# Patient Record
Sex: MALE | Race: WHITE | Employment: PART TIME | URBAN - NONMETROPOLITAN AREA
[De-identification: names, ages, dates, MRNs, and addresses within clinical notes are randomized per-mention and may not be internally consistent; named-entity substitution may affect disease eponyms.]

---

## 2017-08-29 ENCOUNTER — APPOINTMENT (OUTPATIENT)
Dept: GENERAL RADIOLOGY | Age: 52
End: 2017-08-29
Payer: COMMERCIAL

## 2017-08-29 ENCOUNTER — HOSPITAL ENCOUNTER (EMERGENCY)
Age: 52
Discharge: HOME | End: 2017-08-29
Attending: EMERGENCY MEDICINE
Payer: COMMERCIAL

## 2017-08-29 VITALS
TEMPERATURE: 98.5 F | BODY MASS INDEX: 30.48 KG/M2 | HEART RATE: 70 BPM | WEIGHT: 230 LBS | HEIGHT: 73 IN | RESPIRATION RATE: 18 BRPM | DIASTOLIC BLOOD PRESSURE: 86 MMHG | OXYGEN SATURATION: 96 % | SYSTOLIC BLOOD PRESSURE: 148 MMHG

## 2017-08-29 DIAGNOSIS — Z72.820 SLEEP DEFICIENT: ICD-10-CM

## 2017-08-29 DIAGNOSIS — R53.83 LOW ENERGY: Primary | ICD-10-CM

## 2017-08-29 LAB
ALBUMIN SERPL-MCNC: 4.1 G/DL (ref 3.5–5.1)
ALP BLD-CCNC: 79 U/L (ref 38–126)
ALT SERPL-CCNC: 47 U/L (ref 11–66)
AMORPHOUS: NORMAL
AMPHETAMINE+METHAMPHETAMINE URINE SCREEN: NEGATIVE
ANION GAP SERPL CALCULATED.3IONS-SCNC: 14 MEQ/L (ref 8–16)
AST SERPL-CCNC: 29 U/L (ref 5–40)
BACTERIA: NORMAL
BARBITURATE QUANTITATIVE URINE: NEGATIVE
BASOPHILS # BLD: 0.6 %
BASOPHILS ABSOLUTE: 0 THOU/MM3 (ref 0–0.1)
BENZODIAZEPINE QUANTITATIVE URINE: NEGATIVE
BILIRUB SERPL-MCNC: 0.2 MG/DL (ref 0.3–1.2)
BILIRUBIN URINE: NEGATIVE
BLOOD, URINE: NEGATIVE
BUN BLDV-MCNC: 15 MG/DL (ref 7–22)
CALCIUM SERPL-MCNC: 9.4 MG/DL (ref 8.5–10.5)
CANNABINOID QUANTITATIVE URINE: NEGATIVE
CASTS: NORMAL /LPF
CHARACTER, URINE: CLEAR
CHLORIDE BLD-SCNC: 100 MEQ/L (ref 98–111)
CO2: 27 MEQ/L (ref 23–33)
COCAINE METABOLITE QUANTITATIVE URINE: NEGATIVE
COLOR: YELLOW
CREAT SERPL-MCNC: 0.7 MG/DL (ref 0.4–1.2)
CRYSTALS: NORMAL
EKG ATRIAL RATE: 60 BPM
EKG P AXIS: 47 DEGREES
EKG P-R INTERVAL: 160 MS
EKG Q-T INTERVAL: 384 MS
EKG QRS DURATION: 88 MS
EKG QTC CALCULATION (BAZETT): 384 MS
EKG R AXIS: 44 DEGREES
EKG T AXIS: 53 DEGREES
EKG VENTRICULAR RATE: 60 BPM
EOSINOPHIL # BLD: 4.3 %
EOSINOPHILS ABSOLUTE: 0.3 THOU/MM3 (ref 0–0.4)
EPITHELIAL CELLS, UA: NORMAL /HPF
GFR SERPL CREATININE-BSD FRML MDRD: > 90 ML/MIN/1.73M2
GLUCOSE BLD-MCNC: 94 MG/DL (ref 70–108)
GLUCOSE, URINE: NEGATIVE MG/DL
HCT VFR BLD CALC: 42.7 % (ref 42–52)
HEMOGLOBIN: 14.6 GM/DL (ref 14–18)
KETONES, URINE: NEGATIVE
LEUKOCYTE ESTERASE, URINE: NEGATIVE
LYMPHOCYTES # BLD: 31 %
LYMPHOCYTES ABSOLUTE: 2 THOU/MM3 (ref 1–4.8)
MCH RBC QN AUTO: 31.3 PG (ref 27–31)
MCHC RBC AUTO-ENTMCNC: 34.2 GM/DL (ref 33–37)
MCV RBC AUTO: 91.4 FL (ref 80–94)
MONOCYTES # BLD: 7.8 %
MONOCYTES ABSOLUTE: 0.5 THOU/MM3 (ref 0.4–1.3)
NITRITE, URINE: NEGATIVE
NUCLEATED RED BLOOD CELLS: 0 /100 WBC
OPIATES, URINE: NEGATIVE
OSMOLALITY CALCULATION: 281.8 MOSMOL/KG (ref 275–300)
OXYCODONE: NEGATIVE
PDW BLD-RTO: 13 % (ref 11.5–14.5)
PH UA: 6.5
PHENCYCLIDINE QUANTITATIVE URINE: NEGATIVE
PLATELET # BLD: 211 THOU/MM3 (ref 130–400)
PMV BLD AUTO: 7.7 MCM (ref 7.4–10.4)
POTASSIUM SERPL-SCNC: 3.8 MEQ/L (ref 3.5–5.2)
PROTEIN UA: NEGATIVE MG/DL
RBC # BLD: 4.67 MILL/MM3 (ref 4.7–6.1)
RBC # BLD: NORMAL 10*6/UL
RBC URINE: NORMAL /HPF
RENAL EPITHELIAL, UA: NORMAL
SEG NEUTROPHILS: 56.3 %
SEGMENTED NEUTROPHILS ABSOLUTE COUNT: 3.5 THOU/MM3 (ref 1.8–7.7)
SODIUM BLD-SCNC: 141 MEQ/L (ref 135–145)
SPECIFIC GRAVITY UA: 1.01 (ref 1–1.03)
TOTAL PROTEIN: 6.8 G/DL (ref 6.1–8)
TROPONIN T: < 0.01 NG/ML
TSH SERPL DL<=0.05 MIU/L-ACNC: 2.4 UIU/ML (ref 0.4–4.2)
UROBILINOGEN, URINE: 0.2 EU/DL
WBC # BLD: 6.3 THOU/MM3 (ref 4.8–10.8)
WBC UA: NORMAL /HPF
YEAST: NORMAL

## 2017-08-29 PROCEDURE — 36415 COLL VENOUS BLD VENIPUNCTURE: CPT

## 2017-08-29 PROCEDURE — 80307 DRUG TEST PRSMV CHEM ANLYZR: CPT

## 2017-08-29 PROCEDURE — 80050 GENERAL HEALTH PANEL: CPT

## 2017-08-29 PROCEDURE — 96361 HYDRATE IV INFUSION ADD-ON: CPT

## 2017-08-29 PROCEDURE — 2580000003 HC RX 258: Performed by: EMERGENCY MEDICINE

## 2017-08-29 PROCEDURE — 71020 XR CHEST STANDARD TWO VW: CPT

## 2017-08-29 PROCEDURE — 96360 HYDRATION IV INFUSION INIT: CPT

## 2017-08-29 PROCEDURE — 93005 ELECTROCARDIOGRAM TRACING: CPT

## 2017-08-29 PROCEDURE — 81001 URINALYSIS AUTO W/SCOPE: CPT

## 2017-08-29 PROCEDURE — 84484 ASSAY OF TROPONIN QUANT: CPT

## 2017-08-29 PROCEDURE — 99285 EMERGENCY DEPT VISIT HI MDM: CPT

## 2017-08-29 RX ORDER — LISINOPRIL AND HYDROCHLOROTHIAZIDE 12.5; 1 MG/1; MG/1
1 TABLET ORAL DAILY
COMMUNITY

## 2017-08-29 RX ORDER — VENLAFAXINE HYDROCHLORIDE 150 MG/1
150 CAPSULE, EXTENDED RELEASE ORAL DAILY
COMMUNITY
End: 2022-06-08 | Stop reason: SDUPTHER

## 2017-08-29 RX ORDER — 0.9 % SODIUM CHLORIDE 0.9 %
500 INTRAVENOUS SOLUTION INTRAVENOUS ONCE
Status: COMPLETED | OUTPATIENT
Start: 2017-08-29 | End: 2017-08-29

## 2017-08-29 RX ORDER — OMEPRAZOLE 40 MG/1
40 CAPSULE, DELAYED RELEASE ORAL PRN
COMMUNITY

## 2017-08-29 RX ORDER — MELOXICAM 15 MG/1
15 TABLET ORAL DAILY
COMMUNITY

## 2017-08-29 RX ORDER — BUPROPION HYDROCHLORIDE 150 MG/1
150 TABLET, EXTENDED RELEASE ORAL DAILY
COMMUNITY
End: 2022-06-08

## 2017-08-29 RX ADMIN — SODIUM CHLORIDE 500 ML: 9 INJECTION, SOLUTION INTRAVENOUS at 14:18

## 2022-06-07 NOTE — PROGRESS NOTES
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 instructed to seek emergency help via the emergency and/or calling 911 should symptoms become severe, worsen, or with other concerning symptoms. Patient instructed to go immediately to the emergency room and/or call 911 with any suicidal or homicidal ideations or if audio/visual hallucinations develop. Patient stated understanding and agrees. Patient given crisis center information. I spent a total of 120 minutes with the patient and over half of that time was spent on counseling and coordination of care regarding topics discussed above. I spent 100 minutes with the patient for psychotherapy. The patient was engaged and responsive throughout session. Utilized CBT, MI and reflective listening to address topics above. The remainder of session spent on symptom evaluation and medication management. Noé Lundberg, was evaluated through a synchronous (real-time) audio-video encounter. The patient (or guardian if applicable) is aware that this is a billable service, which includes applicable co-pays. This Virtual Visit was conducted with patient's (and/or legal guardian's) consent.  The visit was conducted pursuant to the emergency declaration under the [facility], [facility] authority and the [employer] and [employer] General Act. Patient identification was verified, and a caregiver was present when appropriate. The patient was located at Home: [address] [place] [address]. Provider was located at Home ([facility]): [place]. Total time spent for this encounter: [address], APRN - CNP on 6/8/2022 at 1:53 PM    An electronic signature was used to authenticate this note.

## 2022-06-08 ENCOUNTER — TELEPHONE (OUTPATIENT)
Dept: PSYCHIATRY | Age: 57
End: 2022-06-08

## 2022-06-08 ENCOUNTER — SCHEDULED TELEPHONE ENCOUNTER (OUTPATIENT)
Dept: PSYCHIATRY | Age: 57
End: 2022-06-08
Payer: COMMERCIAL

## 2022-06-08 DIAGNOSIS — F33.1 MAJOR DEPRESSIVE DISORDER, RECURRENT EPISODE, MODERATE (HCC): Primary | ICD-10-CM

## 2022-06-08 DIAGNOSIS — F43.12 CHRONIC POST-TRAUMATIC STRESS DISORDER (PTSD): ICD-10-CM

## 2022-06-08 DIAGNOSIS — F41.1 GAD (GENERALIZED ANXIETY DISORDER): ICD-10-CM

## 2022-06-08 PROCEDURE — 90792 PSYCH DIAG EVAL W/MED SRVCS: CPT

## 2022-06-08 RX ORDER — ARIPIPRAZOLE 5 MG/1
2.5 TABLET ORAL DAILY
Qty: 30 TABLET | Refills: 0 | Status: SHIPPED | OUTPATIENT
Start: 2022-06-08 | End: 2022-06-22 | Stop reason: SDUPTHER

## 2022-06-08 RX ORDER — VENLAFAXINE HYDROCHLORIDE 75 MG/1
75 CAPSULE, EXTENDED RELEASE ORAL DAILY
Qty: 30 CAPSULE | Refills: 0 | Status: SHIPPED | OUTPATIENT
Start: 2022-06-08 | End: 2022-06-22 | Stop reason: SDUPTHER

## 2022-06-08 RX ORDER — BUPROPION HYDROCHLORIDE 100 MG/1
100 TABLET, EXTENDED RELEASE ORAL DAILY
Qty: 30 TABLET | Refills: 0 | Status: SHIPPED | OUTPATIENT
Start: 2022-06-08 | End: 2022-06-22 | Stop reason: ALTCHOICE

## 2022-06-08 RX ORDER — BUSPIRONE HYDROCHLORIDE 15 MG/1
15 TABLET ORAL 2 TIMES DAILY
Qty: 60 TABLET | Refills: 0 | Status: SHIPPED | OUTPATIENT
Start: 2022-06-08 | End: 2022-07-11 | Stop reason: SDUPTHER

## 2022-06-08 RX ORDER — VENLAFAXINE HYDROCHLORIDE 150 MG/1
150 CAPSULE, EXTENDED RELEASE ORAL DAILY
Qty: 30 CAPSULE | Refills: 0 | Status: SHIPPED | OUTPATIENT
Start: 2022-06-08 | End: 2022-06-22 | Stop reason: SDUPTHER

## 2022-06-08 ASSESSMENT — PATIENT HEALTH QUESTIONNAIRE - PHQ9
7. TROUBLE CONCENTRATING ON THINGS, SUCH AS READING THE NEWSPAPER OR WATCHING TELEVISION: 3
10. IF YOU CHECKED OFF ANY PROBLEMS, HOW DIFFICULT HAVE THESE PROBLEMS MADE IT FOR YOU TO DO YOUR WORK, TAKE CARE OF THINGS AT HOME, OR GET ALONG WITH OTHER PEOPLE: 2
SUM OF ALL RESPONSES TO PHQ QUESTIONS 1-9: 22
5. POOR APPETITE OR OVEREATING: 0
9. THOUGHTS THAT YOU WOULD BE BETTER OFF DEAD, OR OF HURTING YOURSELF: 3
2. FEELING DOWN, DEPRESSED OR HOPELESS: 2
SUM OF ALL RESPONSES TO PHQ QUESTIONS 1-9: 22
SUM OF ALL RESPONSES TO PHQ9 QUESTIONS 1 & 2: 5
1. LITTLE INTEREST OR PLEASURE IN DOING THINGS: 3
SUM OF ALL RESPONSES TO PHQ QUESTIONS 1-9: 22
6. FEELING BAD ABOUT YOURSELF - OR THAT YOU ARE A FAILURE OR HAVE LET YOURSELF OR YOUR FAMILY DOWN: 3
SUM OF ALL RESPONSES TO PHQ QUESTIONS 1-9: 19
8. MOVING OR SPEAKING SO SLOWLY THAT OTHER PEOPLE COULD HAVE NOTICED. OR THE OPPOSITE, BEING SO FIGETY OR RESTLESS THAT YOU HAVE BEEN MOVING AROUND A LOT MORE THAN USUAL: 3
3. TROUBLE FALLING OR STAYING ASLEEP: 2
4. FEELING TIRED OR HAVING LITTLE ENERGY: 3

## 2022-06-08 ASSESSMENT — ANXIETY QUESTIONNAIRES
6. BECOMING EASILY ANNOYED OR IRRITABLE: 1
IF YOU CHECKED OFF ANY PROBLEMS ON THIS QUESTIONNAIRE, HOW DIFFICULT HAVE THESE PROBLEMS MADE IT FOR YOU TO DO YOUR WORK, TAKE CARE OF THINGS AT HOME, OR GET ALONG WITH OTHER PEOPLE: EXTREMELY DIFFICULT
7. FEELING AFRAID AS IF SOMETHING AWFUL MIGHT HAPPEN: 3
3. WORRYING TOO MUCH ABOUT DIFFERENT THINGS: 3
5. BEING SO RESTLESS THAT IT IS HARD TO SIT STILL: 2
2. NOT BEING ABLE TO STOP OR CONTROL WORRYING: 3
1. FEELING NERVOUS, ANXIOUS, OR ON EDGE: 1
4. TROUBLE RELAXING: 1
GAD7 TOTAL SCORE: 14

## 2022-06-08 ASSESSMENT — COLUMBIA-SUICIDE SEVERITY RATING SCALE - C-SSRS
1. WITHIN THE PAST MONTH, HAVE YOU WISHED YOU WERE DEAD OR WISHED YOU COULD GO TO SLEEP AND NOT WAKE UP?: YES
6. HAVE YOU EVER DONE ANYTHING, STARTED TO DO ANYTHING, OR PREPARED TO DO ANYTHING TO END YOUR LIFE?: NO
2. HAVE YOU ACTUALLY HAD ANY THOUGHTS OF KILLING YOURSELF?: YES
3. HAVE YOU BEEN THINKING ABOUT HOW YOU MIGHT KILL YOURSELF?: YES
4. HAVE YOU HAD THESE THOUGHTS AND HAD SOME INTENTION OF ACTING ON THEM?: YES
5. HAVE YOU STARTED TO WORK OUT OR WORKED OUT THE DETAILS OF HOW TO KILL YOURSELF? DO YOU INTEND TO CARRY OUT THIS PLAN?: NO

## 2022-06-08 NOTE — PATIENT INSTRUCTIONS
Patient Education        Anxiety Disorder: Care Instructions  Your Care Instructions     Anxiety is a normal reaction to stress. Difficult situations can cause you to have symptoms such as sweaty palms and a nervous feeling. In an anxiety disorder, the symptoms are far more severe. Constant worry, muscle tension, trouble sleeping, nausea and diarrhea, and other symptoms can make normal daily activities difficult or impossible. These symptoms may occur for no reason, and they can affect your work, school, or social life. Medicines, counseling, and self-care can all help. Follow-up care is a key part of your treatment and safety. Be sure to make and go to all appointments, and call your doctor if you are having problems. It's also a good idea to know your test results and keep a list of the medicines you take. How can you care for yourself at home? · Take medicines exactly as directed. Call your doctor if you think you are having a problem with your medicine. · Go to your counseling sessions and follow-up appointments. · Recognize and accept your anxiety. Then, when you are in a situation that makes you anxious, say to yourself, \"This is not an emergency. I feel uncomfortable, but I am not in danger. I can keep going even if I feel anxious. \"  · Be kind to your body:  ? Relieve tension with exercise or a massage. ? Get enough rest.  ? Avoid alcohol, caffeine, nicotine, and illegal drugs. They can increase your anxiety level and cause sleep problems. ? Learn and do relaxation techniques. See below for more about these techniques. · Engage your mind. Get out and do something you enjoy. Go to a funny movie, or take a walk or hike. Plan your day. Having too much or too little to do can make you anxious. · Keep a record of your symptoms. Discuss your fears with a good friend or family member, or join a support group for people with similar problems. Talking to others sometimes relieves stress.   · Get involved in social groups, or volunteer to help others. Being alone sometimes makes things seem worse than they are. · Get at least 30 minutes of exercise on most days of the week to relieve stress. Walking is a good choice. You also may want to do other activities, such as running, swimming, cycling, or playing tennis or team sports. Relaxation techniques  Do relaxation exercises 10 to 20 minutes a day. You can play soothing, relaxing music while you do them, if you wish. · Tell others in your house that you are going to do your relaxation exercises. Ask them not to disturb you. · Find a comfortable place, away from all distractions and noise. · Lie down on your back, or sit with your back straight. · Focus on your breathing. Make it slow and steady. · Breathe in through your nose. Breathe out through either your nose or mouth. · Breathe deeply, filling up the area between your navel and your rib cage. Breathe so that your belly goes up and down. · Do not hold your breath. · Breathe like this for 5 to 10 minutes. Notice the feeling of calmness throughout your whole body. As you continue to breathe slowly and deeply, relax by doing the following for another 5 to 10 minutes:  · Tighten and relax each muscle group in your body. You can begin at your toes and work your way up to your head. · Imagine your muscle groups relaxing and becoming heavy. · Empty your mind of all thoughts. · Let yourself relax more and more deeply. · Become aware of the state of calmness that surrounds you. · When your relaxation time is over, you can bring yourself back to alertness by moving your fingers and toes and then your hands and feet and then stretching and moving your entire body. Sometimes people fall asleep during relaxation, but they usually wake up shortly afterward. · Always give yourself time to return to full alertness before you drive a car or do anything that might cause an accident if you are not fully alert.  Never play a relaxation tape while you drive a car. When should you call for help? Call 911 anytime you think you may need emergency care. For example, call if:    · You feel you cannot stop from hurting yourself or someone else. Keep the numbers for these national suicide hotlines: 1-477-670-TALK (3-585-387-615.361.5780) and 0-008-LZAOQPO (2-412.126.6883). If you or someone you know talks about suicide or feeling hopeless, get help right away. Watch closely for changes in your health, and be sure to contact your doctor if:    · You have anxiety or fear that affects your life.     · You have symptoms of anxiety that are new or different from those you had before. Where can you learn more? Go to https://Voxli.Smart Energy Instruments. org and sign in to your SENSIMED account. Enter P754 in the Globe Icons Interactive box to learn more about \"Anxiety Disorder: Care Instructions. \"     If you do not have an account, please click on the \"Sign Up Now\" link. Current as of: September 23, 2020               Content Version: 12.9  © 2006-2021 [employer]. Care instructions adapted under license by [facility] ([facility]). If you have questions about a medical condition or this instruction, always ask your healthcare professional. [address] any warranty or liability for your use of this information. Patient Education        Recovering From Depression: Care Instructions  Your Care Instructions     Taking good care of yourself is important as you recover from depression. In time, your symptoms will fade as your treatment takes hold. Do not give up. Instead, focus your energy on getting better. Your mood will improve. It just takes some time. Focus on things that can help you feel better, such as being with friends and family, eating well, and getting enough rest. But take things slowly. Do not do too much too soon. Youwill begin to feel better gradually.   Follow-up care is a key part of your treatment and safety. Be sure to make and go to all appointments, and call your doctor if you are having problems. It's also a good idea to know your test results and keep alist of the medicines you take. How can you care for yourself at home? Be realistic   If you have a large task to do, break it up into smaller steps you can handle, and just do what you can.  You may want to put off important decisions until your depression has lifted. If you have plans that will have a major impact on your life, such as marriage, divorce, or a job change, try to wait a bit. Talk it over with friends and loved ones who can help you look at the overall picture first.   Reaching out to people for help is important. Do not isolate yourself. Let your family and friends help you. Find someone you can trust and confide in, and talk to that person.  Be patient, and be kind to yourself. Remember that depression is not your fault and is not something you can overcome with willpower alone. Treatment is important for depression, just like for any other illness. Feeling better takes time, and your mood will improve little by little. Stay active   Stay busy and get outside. Take a walk, or try some other light exercise.  Talk with your doctor about an exercise program. Exercise can help with mild depression.  Go to a movie or concert. Take part in a [employer] activity or other social gathering. Go to a ball game.  Ask a friend to have dinner with you. Take care of yourself   Eat a balanced diet with plenty of fresh fruits and vegetables, whole grains, and lean protein. If you have lost your appetite, eat small snacks rather than large meals.  Avoid using illegal drugs or marijuana and drinking alcohol. Do not take medicines that have not been prescribed for you. They may interfere with medicines you may be taking for depression, or they may make your depression worse.  Take your medicines exactly as they are prescribed.  You may start to feel better within 1 to 3 weeks of taking antidepressant medicine. But it can take as many as 6 to 8 weeks to see more improvement. If you have questions or concerns about your medicines, or if you do not notice any improvement by 3 weeks, talk to your doctor.  Continue to take your medicine after your symptoms improve. Taking your medicine for at least 6 months after you feel better can help keep you from getting depressed again. If this isn't the first time you have been depressed, your doctor may recommend you to take medicine even longer.  If you have any side effects from your medicine, tell your doctor. Many side effects are mild and will go away on their own after you have been taking the medicine for a few weeks. Some may last longer. Talk to your doctor if side effects are bothering you too much. You might be able to try a different medicine.  Continue counseling. It may help prevent depression from returning, especially if you've had multiple episodes of depression. Talk with your counselor if you are having a hard time attending your sessions or you think the sessions aren't working. Don't just stop going.  Get enough sleep. Talk to your doctor if you are having problems sleeping.  Avoid sleeping pills unless they are prescribed by the doctor treating your depression. Sleeping pills may make you groggy during the day, and they may interact with other medicine you are taking.  If you have any other illnesses, such as diabetes, heart disease, or high blood pressure, make sure to continue with your treatment. Tell your doctor about all of the medicines you take, including those with or without a prescription.  If you or someone you know talks about suicide, self-harm, or feeling hopeless, get help right away. Call the [facility] at 1-800-273-talk (2-101.209.1564) or text HOME to 786799 to access the Crisis Text Line.  Consider saving these numbers in your phone.  When should you call for help? Call 911 anytime you think you may need emergency care. For example, call if:     You feel like hurting yourself or someone else.      Someone you know has depression and is about to attempt or is attempting suicide. Call your doctor now or seek immediate medical care if:     You hear voices.      Someone you know has depression and:  ? Starts to give away his or her possessions. ? Uses illegal drugs or drinks alcohol heavily. ? Talks or writes about death, including writing suicide notes or talking about guns, knives, or pills. ? Starts to spend a lot of time alone. ? Acts very aggressively or suddenly appears calm. Watch closely for changes in your health, and be sure to contact your doctor if:     You do not get better as expected. Where can you learn more? Go to https://TIKI.VNpedeboraheb.StandDesk. org and sign in to your FreshPlanet account. Enter N360 in the Anne Fogarty box to learn more about \"Recovering From Depression: Care Instructions. \"     If you do not have an account, please click on the \"Sign Up Now\" link. Current as of: June 16, 2021               Content Version: 13.2  © 3101-7014 Healthwise, [employer]well[employer]. Care instructions adapted under license by [facility] ([facility]). If you have questions about a medical condition or this instruction, always ask your healthcare professional. [place] [address] any warranty or liability for your use of this information. Patient Education        Suicidal Thoughts and Behavior: Care Instructions  Overview  You have been seen by a doctor because you've had thoughts of suicide or have harmed yourself. Your doctor and support team want to help keep you safe. Yourteam may include a , a , and a counselor. People often think about suicide because they feel hopeless, helpless, or worthless.  These feelings may come from having a mental health problem, such asdepression. These problems can be treated. It's important to remember that there are people who care about you. Your doctor and support team take your pain very seriously, and they want to help. Treatment and close follow-up care can help you feel better. Follow-up care is a key part of your treatment and safety. Be sure to make and go to all appointments, and call your doctor if you are having problems. It's also a good idea to know your test results and keep alist of the medicines you take. How can you care for yourself at home?  Talk to someone. Be open about your feelings. Reach out to a trusted family member or friend, your doctor, or a counselor. You can also call the [address] [place] [address] at 1-800-273-talk (7-926.471.3900). Or text HOME to 228197 to access the [place] Text Line. Consider saving these numbers in your phone.  Attend all counseling sessions recommended by your doctor.  Make a suicide safety plan. This is a set of steps you can take when you feel suicidal. It includes your warning signs, coping strategies, and people you can ask for support. It's best to work with a therapist to make your plan.  Ask someone to remove and store any guns, pills, or other means of suicide.  Avoid alcohol and drug use.  Be safe with medicines. Take your medicines exactly as prescribed. Call your doctor if you think you are having a problem with your medicine. When should you call for help? Call 911 anytime you think you may need emergency care. For example, call if:     You feel you cannot stop from hurting yourself or someone else. Call your doctor now or seek immediate medical care if:     You have one or more warning signs of suicide. For example, call if:  ? You feel like giving away your possessions. ? You use illegal drugs or drink alcohol heavily. ? You talk or write about death. This may include writing suicide notes and talking about guns, knives, or pills.   ? You start to spend a lot of time alone or spend more time alone than usual.      You hear voices.      You start acting in an aggressive way that's not normal for you. Watch closely for changes in your health, and be sure to contact your doctor ifyou have any problems. Where can you learn more? Go to https://chpepiceweb.uVore. org and sign in to your Picapica account. Enter B415 in the Osteogenix box to learn more about \"Suicidal Thoughts and Behavior: Care Instructions. \"     If you do not have an account, please click on the \"Sign Up Now\" link. Current as of: June 16, 2021               Content Version: 13.2  © 2006-2022 [employer]. Care instructions adapted under license by [facility] ([facility]). If you have questions about a medical condition or this instruction, always ask your healthcare professional. [place] [address] any warranty or liability for your use of this information. Patient Education        Learning About Making a Suicide Safety Plan  Overview     A safety plan is a set of steps you can take when you feel suicidal. It includes your warning signs, coping strategies, and people to ask for support. You can write your own safety plan or use a free phone varsha. But it's best towork with a therapist to make your plan. How can you make and use your plan? If you feel like you can't keep from hurting yourself or someone else, get help right away. Call 911 or the National Suicide Prevention Lifeline: 9-482-955-TALK (6-866.609.7832). Or text HOME to 632357 to access the Crisis Text Line. Having a safety plan is important for anyone who thinks about suicide. It can help you (or someone you care about) get safely through times of crisis. If possible, try to make your plan during a time when you aren't in a crisis soit's ready when you need it. To use the plan, move through it step-by-step. So first, check your warning signs.  Then try your own coping strategies. If those don't help, move through the rest of the steps until you have the help you need to get through thecrisis. Here's how to make a safety plan. 1. Make a list of your crisis warning signs. What happens when you start to think about suicide? Make a list of your warningsigns--the things you think, feel, or do when you start to feel suicidal.  2. List your personal coping strategies. What can you do or think about to avoid acting when you feel suicidal? This may include your reason(s) to live. Rank these ideas by how well you think they'll work. What might keep you from using them? What might make you more likely touse them? 3. Come up with some sources of support and distraction. Think of people and places that could help shift your attention away from painful feelings or thoughts of dying. This may include children you care aboutor a safe social space, like a coffee shop or a bookstore. 4. Make a list of people you can count on for help. Think about who you could contact in a crisis. Who do you trust and confide in? Who is always there when you need them? This might be a friend, a family member, or someone else, like a caregiver or . If no one comes to mind, that's okay. Be sure you have some professional support, such as a doctor orcounselor. 5. List your professional sources of support. This may include your doctor or therapist, local emergency rooms, and local crisis hotlines. Also include the National Suicide Prevention Lifeline: Call 6-587.425.2726 or text HOME to 061391. Be sure to save these numbers in yourphone. 6. Think through ways to keep yourself safe. Do you have weapons or other means to hurt yourself? Consider how to limit your access to them. For example, if you have a gun, maybe you could ask a friend orfamily member to lock it up for you. When should you call for help? Call 911 anytime you think you may need emergency care.  For example, call if:     You feel you cannot stop from hurting yourself or someone else. Call your doctor now or seek immediate medical care if:     You have one or more warning signs of suicide. For example, call if:  ? You feel like giving away your possessions. ? You use illegal drugs or drink alcohol heavily. ? You talk or write about death. This may include writing suicide notes and talking about guns, knives, or pills. ? You start to spend a lot of time alone or spend more time alone than usual.      You hear voices.      You start acting in an aggressive way that's not normal for you. Watch closely for changes in your health, and be sure to contact your doctor ifyou have any problems. Where can you learn more? Go to https://Peeriope365 Good Teachereb.Conversion Logic. org and sign in to your Black Raven and Stag account. Enter l123 in the Grove Labs box to learn more about \"Learning About Making a Suicide Safety Plan. \"     If you do not have an account, please click on the \"Sign Up Now\" link. Current as of: June 17, 2021               Content Version: 13.2  © 4001-5307 Healthwise, GeneAss[employer]. Care instructions adapted under license by [facility] ([facility]). If you have questions about a medical condition or this instruction, always ask your healthcare professional. [place] [address] any warranty or liability for your use of this information. Patient Education        Post-Traumatic Stress Disorder (PTSD): Care Instructions  Overview     Post-traumatic stress disorder (PTSD) is a mental health problem that can result from being in or seeing a traumatic or terrifying event. These events can include combat, a terrorist attack, a natural disaster, a serious accident, an assault, or a rape. If you have PTSD, you may often relive the experience in nightmares or flashbacks. These are clear and frightening memories of theevent. You may also have trouble sleeping. PTSD affects people in very different ways.  It can interfere with daily activities such as work or school, and it can make you withdraw from friends orloved ones. Follow-up care is a key part of your treatment and safety. Be sure to make and go to all appointments, and call your doctor if you are having problems. It's also a good idea to know your test results and keep alist of the medicines you take. How can you care for yourself at home?  Take your medicines exactly as they are prescribed. If you are taking an antidepressant, you may start to feel better within 1 to 3 weeks. But it can take as many as 6 to 8 weeks to see more improvement. If you have questions or concerns about your medicines, or if you don't notice any improvement after 3 weeks, talk to your doctor.  Go to your counseling sessions and follow-up appointments.  Recognize and accept your anxiety. Then, when you are in a situation that makes you anxious, say to yourself, \"This is not an emergency. I feel uncomfortable, but I am not in danger. I can keep going even if I feel anxious. \"   Be kind to your body:  ? Get enough rest.  ? Get at least 30 minutes of exercise on most days of the week. Walking is a good choice. You also may want to do other activities, such as running, swimming, cycling, or playing tennis or team sports. ? Avoid alcohol, caffeine, nicotine, marijuana, and illegal drugs. They can make your symptoms worse. ? Learn and do relaxation techniques. See below for more about these techniques.  Keep a record of your symptoms. Discuss your fears with a good friend or family member, or join a support group for people with similar problems. Talking to others sometimes relieves stress.  Connect with others. Get involved in social groups, or volunteer to help others. Or get out and do something you enjoy. Watch a movie, or take a walk or hike with a friend.  Keep the numbers for these national suicide hotlines: 2-166-169-TALK (6-895.421.4852) and 4-058-ABTEPYI (4-321.336.6157).  If you or someone you know talks about suicide or feeling hopeless, get help right away. Relaxation techniques  Do relaxation exercises 10 to 20 minutes a day. You can play soothing, relaxingmusic while you do them, if you wish.  Tell others in your house that you are going to do your relaxation exercises. Ask them not to disturb you.  Find a comfortable place, away from all distractions and noise.  Lie down on your back, or sit with your back straight.  Focus on your breathing. Make it slow and steady.  Breathe in through your nose. Breathe out through either your nose or mouth.  Breathe deeply, filling up the area between your navel and your rib cage. Breathe so that your belly goes up and down.  Do not hold your breath.  Breathe like this for 5 to 10 minutes. Notice the feeling of calmness throughout your whole body. As you continue to breathe slowly and deeply, relax by doing the following foranother 5 to 10 minutes:   Tighten and relax each muscle group in your body. You can begin at your toes and work your way up to your head.  Imagine your muscle groups relaxing and becoming heavy.  Empty your mind of all thoughts.  Let yourself relax more and more deeply.  Become aware of the state of calmness that surrounds you.  When your relaxation time is over, you can bring yourself back to alertness by moving your fingers and toes and then your hands and feet and then stretching and moving your entire body. Sometimes people fall asleep during relaxation, but they usually wake up shortly afterward.  Always give yourself time to return to full alertness before you drive a car or do anything that might cause an accident if you are not fully alert. Never play a relaxation tape while you drive a car. When should you call for help? Call 911 anytime you think you may need emergency care. For example, call if:     You feel you cannot stop from hurting yourself or someone else.    Call the [facility] Suicide [facility] at 0-925-934-337.234.9576 if you orsomeone you know is:     Feeling hopeless or thinking of hurting or killing themselves. Watch closely for changes in your health, and be sure to contact your doctor if:     Your PTSD symptoms are getting worse.      You have new or worse symptoms of anxiety or depression.      You are not getting better as expected. Where can you learn more? Go to https://rateGeniuspeHealthvest Holdingseb.NOZA. org and sign in to your ZENTICKET account. Enter Y154 in the enEvolv box to learn more about \"Post-Traumatic Stress Disorder (PTSD): Care Instructions. \"     If you do not have an account, please click on the \"Sign Up Now\" link. Current as of: June 16, 2021               Content Version: 13.2  © 8891-2470 [employer]. Care instructions adapted under license by [facility] ([facility]). If you have questions about a medical condition or this instruction, always ask your healthcare professional. [address] any warranty or liability for your use of this information.

## 2022-06-08 NOTE — TELEPHONE ENCOUNTER
----- Message from JAIME Munoz CNP sent at 6/8/2022  3:42 PM EDT -----  Regarding: RE: [facility],   Please notify pharmacy and patient to wean by taking the Wellbutrin 150mg every other day for 1 week, 100mg every other day for 1 week, then 100mg every third/fourth day for the next week, then stop. I don't see the 50mg immediate release in our system. Thank you,  Orlando Craven  ----- Message -----  From: Franko Ang MA  Sent: 6/8/2022   2:00 PM EDT  To: JAIME Munoz CNP  Subject: PHARMACY QUESTION                                Alberto Nicholas! For some reason epic wouldn't let me put in a telephone encounter with his chart open from today. Kathy from the pharmacy called regarding the Wellbutrin  mg Rx. She said that these tablets can not be cut in half to make 50 mg. She states the lowest strength of Wellbutrin SR is 100 mg. She does states there is a 50 mg immediate release of wellbutrin but they would need a new script clarifying what is needed. I hope this makes sense. Let me know if you have questions!     Thank you :)

## 2022-06-22 ENCOUNTER — OFFICE VISIT (OUTPATIENT)
Dept: PSYCHIATRY | Age: 57
End: 2022-06-22
Payer: COMMERCIAL

## 2022-06-22 DIAGNOSIS — F41.1 GAD (GENERALIZED ANXIETY DISORDER): ICD-10-CM

## 2022-06-22 DIAGNOSIS — F33.1 MAJOR DEPRESSIVE DISORDER, RECURRENT EPISODE, MODERATE (HCC): Primary | ICD-10-CM

## 2022-06-22 DIAGNOSIS — F43.12 CHRONIC POST-TRAUMATIC STRESS DISORDER (PTSD): ICD-10-CM

## 2022-06-22 PROCEDURE — 99214 OFFICE O/P EST MOD 30 MIN: CPT

## 2022-06-22 PROCEDURE — 90833 PSYTX W PT W E/M 30 MIN: CPT

## 2022-06-22 RX ORDER — ARIPIPRAZOLE 5 MG/1
2.5 TABLET ORAL DAILY
Qty: 30 TABLET | Refills: 0 | Status: SHIPPED | OUTPATIENT
Start: 2022-06-22 | End: 2022-07-13 | Stop reason: SDUPTHER

## 2022-06-22 RX ORDER — VENLAFAXINE HYDROCHLORIDE 75 MG/1
75 CAPSULE, EXTENDED RELEASE ORAL DAILY
Qty: 30 CAPSULE | Refills: 0 | Status: SHIPPED | OUTPATIENT
Start: 2022-06-22 | End: 2022-07-13 | Stop reason: ALTCHOICE

## 2022-06-22 RX ORDER — VENLAFAXINE HYDROCHLORIDE 150 MG/1
150 CAPSULE, EXTENDED RELEASE ORAL DAILY
Qty: 30 CAPSULE | Refills: 0 | Status: SHIPPED | OUTPATIENT
Start: 2022-06-22 | End: 2022-07-13 | Stop reason: ALTCHOICE

## 2022-06-22 ASSESSMENT — ANXIETY QUESTIONNAIRES
2. NOT BEING ABLE TO STOP OR CONTROL WORRYING: 0
4. TROUBLE RELAXING: 2
GAD7 TOTAL SCORE: 4
IF YOU CHECKED OFF ANY PROBLEMS ON THIS QUESTIONNAIRE, HOW DIFFICULT HAVE THESE PROBLEMS MADE IT FOR YOU TO DO YOUR WORK, TAKE CARE OF THINGS AT HOME, OR GET ALONG WITH OTHER PEOPLE: NOT DIFFICULT AT ALL
1. FEELING NERVOUS, ANXIOUS, OR ON EDGE: 0
3. WORRYING TOO MUCH ABOUT DIFFERENT THINGS: 0
7. FEELING AFRAID AS IF SOMETHING AWFUL MIGHT HAPPEN: 0
5. BEING SO RESTLESS THAT IT IS HARD TO SIT STILL: 1
6. BECOMING EASILY ANNOYED OR IRRITABLE: 1

## 2022-06-22 ASSESSMENT — PATIENT HEALTH QUESTIONNAIRE - PHQ9
4. FEELING TIRED OR HAVING LITTLE ENERGY: 1
SUM OF ALL RESPONSES TO PHQ QUESTIONS 1-9: 6
SUM OF ALL RESPONSES TO PHQ9 QUESTIONS 1 & 2: 3
7. TROUBLE CONCENTRATING ON THINGS, SUCH AS READING THE NEWSPAPER OR WATCHING TELEVISION: 1
2. FEELING DOWN, DEPRESSED OR HOPELESS: 0
9. THOUGHTS THAT YOU WOULD BE BETTER OFF DEAD, OR OF HURTING YOURSELF: 0
8. MOVING OR SPEAKING SO SLOWLY THAT OTHER PEOPLE COULD HAVE NOTICED. OR THE OPPOSITE, BEING SO FIGETY OR RESTLESS THAT YOU HAVE BEEN MOVING AROUND A LOT MORE THAN USUAL: 0
SUM OF ALL RESPONSES TO PHQ QUESTIONS 1-9: 6
10. IF YOU CHECKED OFF ANY PROBLEMS, HOW DIFFICULT HAVE THESE PROBLEMS MADE IT FOR YOU TO DO YOUR WORK, TAKE CARE OF THINGS AT HOME, OR GET ALONG WITH OTHER PEOPLE: 1
3. TROUBLE FALLING OR STAYING ASLEEP: 1
1. LITTLE INTEREST OR PLEASURE IN DOING THINGS: 3
SUM OF ALL RESPONSES TO PHQ QUESTIONS 1-9: 6
6. FEELING BAD ABOUT YOURSELF - OR THAT YOU ARE A FAILURE OR HAVE LET YOURSELF OR YOUR FAMILY DOWN: 0
SUM OF ALL RESPONSES TO PHQ QUESTIONS 1-9: 6
5. POOR APPETITE OR OVEREATING: 0

## 2022-06-22 NOTE — PATIENT INSTRUCTIONS
Patient Education        Anxiety Disorder: Care Instructions  Your Care Instructions     Anxiety is a normal reaction to stress. Difficult situations can cause you to have symptoms such as sweaty palms and a nervous feeling. In an anxiety disorder, the symptoms are far more severe. Constant worry, muscle tension, trouble sleeping, nausea and diarrhea, and other symptoms can make normal daily activities difficult or impossible. These symptoms may occur for no reason, and they can affect your work, school, or social life. Medicines, counseling, and self-care can all help. Follow-up care is a key part of your treatment and safety. Be sure to make and go to all appointments, and call your doctor if you are having problems. It's also a good idea to know your test results and keep a list of the medicines you take. How can you care for yourself at home? · Take medicines exactly as directed. Call your doctor if you think you are having a problem with your medicine. · Go to your counseling sessions and follow-up appointments. · Recognize and accept your anxiety. Then, when you are in a situation that makes you anxious, say to yourself, \"This is not an emergency. I feel uncomfortable, but I am not in danger. I can keep going even if I feel anxious. \"  · Be kind to your body:  ? Relieve tension with exercise or a massage. ? Get enough rest.  ? Avoid alcohol, caffeine, nicotine, and illegal drugs. They can increase your anxiety level and cause sleep problems. ? Learn and do relaxation techniques. See below for more about these techniques. · Engage your mind. Get out and do something you enjoy. Go to a funny movie, or take a walk or hike. Plan your day. Having too much or too little to do can make you anxious. · Keep a record of your symptoms. Discuss your fears with a good friend or family member, or join a support group for people with similar problems. Talking to others sometimes relieves stress.   · Get involved in social groups, or volunteer to help others. Being alone sometimes makes things seem worse than they are. · Get at least 30 minutes of exercise on most days of the week to relieve stress. Walking is a good choice. You also may want to do other activities, such as running, swimming, cycling, or playing tennis or team sports. Relaxation techniques  Do relaxation exercises 10 to 20 minutes a day. You can play soothing, relaxing music while you do them, if you wish. · Tell others in your house that you are going to do your relaxation exercises. Ask them not to disturb you. · Find a comfortable place, away from all distractions and noise. · Lie down on your back, or sit with your back straight. · Focus on your breathing. Make it slow and steady. · Breathe in through your nose. Breathe out through either your nose or mouth. · Breathe deeply, filling up the area between your navel and your rib cage. Breathe so that your belly goes up and down. · Do not hold your breath. · Breathe like this for 5 to 10 minutes. Notice the feeling of calmness throughout your whole body. As you continue to breathe slowly and deeply, relax by doing the following for another 5 to 10 minutes:  · Tighten and relax each muscle group in your body. You can begin at your toes and work your way up to your head. · Imagine your muscle groups relaxing and becoming heavy. · Empty your mind of all thoughts. · Let yourself relax more and more deeply. · Become aware of the state of calmness that surrounds you. · When your relaxation time is over, you can bring yourself back to alertness by moving your fingers and toes and then your hands and feet and then stretching and moving your entire body. Sometimes people fall asleep during relaxation, but they usually wake up shortly afterward. · Always give yourself time to return to full alertness before you drive a car or do anything that might cause an accident if you are not fully alert.  Never play a relaxation tape while you drive a car. When should you call for help? Call 911 anytime you think you may need emergency care. For example, call if:    · You feel you cannot stop from hurting yourself or someone else. Keep the numbers for these national suicide hotlines: 1-830-005-TALK (2-305.755.5705) and 8-826-SBXPEBY (8-662.270.8653). If you or someone you know talks about suicide or feeling hopeless, get help right away. Watch closely for changes in your health, and be sure to contact your doctor if:    · You have anxiety or fear that affects your life.     · You have symptoms of anxiety that are new or different from those you had before. Where can you learn more? Go to https://Tribotek.[email or web address] org and sign in to your ParaShoot account. Enter P754 in the BasisCode box to learn more about \"Anxiety Disorder: Care Instructions. \"     If you do not have an account, please click on the \"Sign Up Now\" link. Current as of: September 23, 2020               Content Version: 12.9  © 0671-9806 Freebase. Care instructions adapted under license by [facility] ([facility]). If you have questions about a medical condition or this instruction, always ask your healthcare professional. [address] any warranty or liability for your use of this information. Patient Education        Recovering From Depression: Care Instructions  Your Care Instructions     Taking good care of yourself is important as you recover from depression. In time, your symptoms will fade as your treatment takes hold. Do not give up. Instead, focus your energy on getting better. Your mood will improve. It just takes some time. Focus on things that can help you feel better, such as being with friends and family, eating well, and getting enough rest. But take things slowly. Do not do too much too soon. Youwill begin to feel better gradually.   Follow-up care is a key part of your treatment and safety. Be sure to make and go to all appointments, and call your doctor if you are having problems. It's also a good idea to know your test results and keep alist of the medicines you take. How can you care for yourself at home? Be realistic   If you have a large task to do, break it up into smaller steps you can handle, and just do what you can.  You may want to put off important decisions until your depression has lifted. If you have plans that will have a major impact on your life, such as marriage, divorce, or a job change, try to wait a bit. Talk it over with friends and loved ones who can help you look at the overall picture first.   Reaching out to people for help is important. Do not isolate yourself. Let your family and friends help you. Find someone you can trust and confide in, and talk to that person.  Be patient, and be kind to yourself. Remember that depression is not your fault and is not something you can overcome with willpower alone. Treatment is important for depression, just like for any other illness. Feeling better takes time, and your mood will improve little by little. Stay active   Stay busy and get outside. Take a walk, or try some other light exercise.  Talk with your doctor about an exercise program. Exercise can help with mild depression.  Go to a movie or concert. Take part in a [employer] activity or other social gathering. Go to a ball game.  Ask a friend to have dinner with you. Take care of yourself   Eat a balanced diet with plenty of fresh fruits and vegetables, whole grains, and lean protein. If you have lost your appetite, eat small snacks rather than large meals.  Avoid using illegal drugs or marijuana and drinking alcohol. Do not take medicines that have not been prescribed for you. They may interfere with medicines you may be taking for depression, or they may make your depression worse.  Take your medicines exactly as they are prescribed.  You may start to feel better within 1 to 3 weeks of taking antidepressant medicine. But it can take as many as 6 to 8 weeks to see more improvement. If you have questions or concerns about your medicines, or if you do not notice any improvement by 3 weeks, talk to your doctor.  Continue to take your medicine after your symptoms improve. Taking your medicine for at least 6 months after you feel better can help keep you from getting depressed again. If this isn't the first time you have been depressed, your doctor may recommend you to take medicine even longer.  If you have any side effects from your medicine, tell your doctor. Many side effects are mild and will go away on their own after you have been taking the medicine for a few weeks. Some may last longer. Talk to your doctor if side effects are bothering you too much. You might be able to try a different medicine.  Continue counseling. It may help prevent depression from returning, especially if you've had multiple episodes of depression. Talk with your counselor if you are having a hard time attending your sessions or you think the sessions aren't working. Don't just stop going.  Get enough sleep. Talk to your doctor if you are having problems sleeping.  Avoid sleeping pills unless they are prescribed by the doctor treating your depression. Sleeping pills may make you groggy during the day, and they may interact with other medicine you are taking.  If you have any other illnesses, such as diabetes, heart disease, or high blood pressure, make sure to continue with your treatment. Tell your doctor about all of the medicines you take, including those with or without a prescription.  If you or someone you know talks about suicide, self-harm, or feeling hopeless, get help right away. Call the [facility] at 1-800-273-talk (3-953.588.4543) or text HOME to 698939 to access the Crisis Text Line.  Consider saving these numbers in your phone.  When should you call for help? Call 911 anytime you think you may need emergency care. For example, call if:     You feel like hurting yourself or someone else.      Someone you know has depression and is about to attempt or is attempting suicide. If you or someone you know talks about suicide, self-harm, or feeling hopeless, get help right away. Call the [address] [facility] at 6-737-719-XXPF (5-466.601.9821) or text HOME to 984465 to access the [facility] yavalu Biolase. Consider saving these numbers in your phone. Call your doctor now or seek immediate medical care if:     You hear voices.      Someone you know has depression and:  ? Starts to give away possessions. ? Uses illegal drugs or drinks alcohol heavily. ? Talks or writes about death, including writing suicide notes or talking about guns, knives, or pills. ? Starts to spend a lot of time alone. ? Acts very aggressively or suddenly appears calm. Watch closely for changes in your health, and be sure to contact your doctor if:     You do not get better as expected. Where can you learn more? Go to https://Primeksspepiceweb.[email or web address] org and sign in to your Madwire Media account. Enter Q019 in the Flite box to learn more about \"Recovering From Depression: Care Instructions. \"     If you do not have an account, please click on the \"Sign Up Now\" link. Current as of: February 9, 2022               Content Version: 13.3  © 2006-2022 Healthwise, Incorporated. Care instructions adapted under license by [facility] ([facility]). If you have questions about a medical condition or this instruction, always ask your healthcare professional. [place] [address] any warranty or liability for your use of this information.          Patient Education        Post-Traumatic Stress Disorder (PTSD): Care Instructions  Overview     Post-traumatic stress disorder (PTSD) is a mental health problem that can result from being in or seeing a traumatic or terrifying event. These events can include combat, a terrorist attack, a natural disaster, a serious accident, an assault, or a rape. If you have PTSD, you may often relive the experience in nightmares or flashbacks. These are clear and frightening memories of theevent. You may also have trouble sleeping. PTSD affects people in very different ways. It can interfere with daily activities such as work or school, and it can make you withdraw from friends orloved ones. Follow-up care is a key part of your treatment and safety. Be sure to make and go to all appointments, and call your doctor if you are having problems. It's also a good idea to know your test results and keep alist of the medicines you take. How can you care for yourself at home?  Take your medicines exactly as they are prescribed. If you are taking an antidepressant, you may start to feel better within 1 to 3 weeks. But it can take as many as 6 to 8 weeks to see more improvement. If you have questions or concerns about your medicines, or if you don't notice any improvement after 3 weeks, talk to your doctor.  Go to your counseling sessions and follow-up appointments.  Recognize and accept your anxiety. Then, when you are in a situation that makes you anxious, say to yourself, \"This is not an emergency. I feel uncomfortable, but I am not in danger. I can keep going even if I feel anxious. \"   Be kind to your body:  ? Get enough rest.  ? Get at least 30 minutes of exercise on most days of the week. Walking is a good choice. You also may want to do other activities, such as running, swimming, cycling, or playing tennis or team sports. ? Avoid alcohol, caffeine, nicotine, marijuana, and illegal drugs. They can make your symptoms worse. ? Learn and do relaxation techniques. See below for more about these techniques.  Keep a record of your symptoms.  Discuss your fears with a good friend or family member, or join a support group for people with similar problems. Talking to others sometimes relieves stress.  Connect with others. Get involved in social groups, or volunteer to help others. Or get out and do something you enjoy. Watch a movie, or take a walk or hike with a friend.  If you or someone you know talks about suicide, self-harm, or feeling hopeless, get help right away. Call the [address] [place] [address] at 2-776-524-RPRS (6-147.759.6366) or text HOME to 782262 to access the HEXIO Text Line. Consider saving these numbers in your phone. Relaxation techniques  Do relaxation exercises 10 to 20 minutes a day. You can play soothing, relaxingmusic while you do them, if you wish.  Tell others in your house that you are going to do your relaxation exercises. Ask them not to disturb you.  Find a comfortable place, away from all distractions and noise.  Lie down on your back, or sit with your back straight.  Focus on your breathing. Make it slow and steady.  Breathe in through your nose. Breathe out through either your nose or mouth.  Breathe deeply, filling up the area between your navel and your rib cage. Breathe so that your belly goes up and down.  Do not hold your breath.  Breathe like this for 5 to 10 minutes. Notice the feeling of calmness throughout your whole body. As you continue to breathe slowly and deeply, relax by doing the following foranother 5 to 10 minutes:   Tighten and relax each muscle group in your body. You can begin at your toes and work your way up to your head.  Imagine your muscle groups relaxing and becoming heavy.  Empty your mind of all thoughts.  Let yourself relax more and more deeply.  Become aware of the state of calmness that surrounds you.  When your relaxation time is over, you can bring yourself back to alertness by moving your fingers and toes and then your hands and feet and then stretching and moving your entire body.  Sometimes people fall asleep during relaxation, but they usually wake up shortly afterward.  Always give yourself time to return to full alertness before you drive a car or do anything that might cause an accident if you are not fully alert. Never play a relaxation tape while you drive a car. When should you call for help? Call 911 anytime you think you may need emergency care. For example, call if:     You feel you cannot stop from hurting yourself or someone else.      You feel hopeless or think of hurting or killing yourself. If you or someone you know talks about suicide, self-harm, or feeling hopeless, get help right away. Call the [facility] at 7-103-271-QZJG (7-892.453.5126) or text HOME to 649259 to access the [employer]. Consider saving these numbers in your phone. Watch closely for changes in your health, and be sure to contact your doctor if:     Your PTSD symptoms are getting worse.      You have new or worse symptoms of anxiety or depression.      You are not getting better as expected. Where can you learn more? Go to [email or web address]://Ludia.[email or web address] org and sign in to your MediaWheel account. Enter U656 in the ChatID box to learn more about \"Post-Traumatic Stress Disorder (PTSD): Care Instructions. \"     If you do not have an account, please click on the \"Sign Up Now\" link. Current as of: February 9, 2022               Content Version: 13.3  © 2398-9753 WiDaPeople. Care instructions adapted under license by [facility] ([facility]). If you have questions about a medical condition or this instruction, always ask your healthcare professional. [place] [address] any warranty or liability for your use of this information. Patient Education        Suicidal Thoughts and Behavior: Care Instructions  Overview  You have been seen by a doctor because you've had thoughts of suicide or have harmed yourself.  Your doctor and support team want to help keep you safe. Yourteam may include a , a , and a counselor. People often think about suicide because they feel hopeless, helpless, or worthless. These feelings may come from having a mental health problem, such asdepression. These problems can be treated. It's important to remember that there are people who care about you. Your doctor and support team take your pain very seriously, and they want to help. Treatment and close follow-up care can help you feel better. Follow-up care is a key part of your treatment and safety. Be sure to make and go to all appointments, and call your doctor if you are having problems. It's also a good idea to know your test results and keep alist of the medicines you take. How can you care for yourself at home?  Talk to someone. Be open about your feelings. Reach out to a trusted family member or friend, your doctor, or a counselor. You can also call the [address] [place] [address] at 2-033-325-GBBM (8-514.199.6439). Or text HOME to 418634 to access the ENDOGENX Text Line. Consider saving these numbers in your phone.  Attend all counseling sessions recommended by your doctor.  Make a suicide safety plan. This is a set of steps you can take when you feel suicidal. It includes your warning signs, coping strategies, and people you can ask for support. It's best to work with a therapist to make your plan.  Ask someone to remove and store any guns, pills, or other means of suicide.  Avoid alcohol and drug use.  Be safe with medicines. Take your medicines exactly as prescribed. Call your doctor if you think you are having a problem with your medicine. When should you call for help? Call 751 anytime you think you may need emergency care. For example, call if:     You feel you cannot stop from hurting yourself or someone else. If you or someone you know talks about suicide, self-harm, or feeling hopeless, get help right away.  Call the [facility] Suicide Prevention Lifeline at 8-295-033-TALK (7-449.377.8206) or text HOME to 514660 to access the [address] Consider saving these numbers in your phone. Call your doctor now or seek immediate medical care if:     You have one or more warning signs of suicide. For example, call if:  ? You feel like giving away your possessions. ? You use illegal drugs or drink alcohol heavily. ? You talk or write about death. This may include writing suicide notes and talking about guns, knives, or pills. ? You start to spend a lot of time alone or spend more time alone than usual.      You hear voices.      You start acting in an aggressive way that's not normal for you. Watch closely for changes in your health, and be sure to contact your doctor ifyou have any problems. Where can you learn more? Go to https://MynglepeCluster Labs.[email or web address] org and sign in to your BULX account. Enter G086 in the Credii box to learn more about \"Suicidal Thoughts and Behavior: Care Instructions. \"     If you do not have an account, please click on the \"Sign Up Now\" link. Current as of: February 9, 2022               Content Version: 13.3  © 3808-6795 innocutis. Care instructions adapted under license by [facility] ([facility]). If you have questions about a medical condition or this instruction, always ask your healthcare professional. [place] [address] any warranty or liability for your use of this information. Patient Education        Learning About Making a Suicide Safety Plan  Overview     A safety plan is a set of steps you can take when you feel suicidal. It includes your warning signs, coping strategies, and people to ask for support. You can write your own safety plan or use a free phone varsha. But it's best towork with a therapist to make your plan. How can you make and use your plan? If you feel like you can't keep from hurting yourself or someone else, get help right away.  Call 911 or the National Suicide Prevention Lifeline: 8-015-391-TALK (0-855.247.9837). Or text HOME to 211785 to access the Crisis Text Line. Having a safety plan is important for anyone who thinks about suicide. It can help you (or someone you care about) get safely through times of crisis. If possible, try to make your plan during a time when you aren't in a crisis soit's ready when you need it. To use the plan, move through it step-by-step. So first, check your warning signs. Then try your own coping strategies. If those don't help, move through the rest of the steps until you have the help you need to get through thecrisis. Here's how to make a safety plan. 1. Make a list of your crisis warning signs. What happens when you start to think about suicide? Make a list of your warningsigns--the things you think, feel, or do when you start to feel suicidal.  2. List your personal coping strategies. What can you do or think about to avoid acting when you feel suicidal? This may include your reason(s) to live. Rank these ideas by how well you think they'll work. What might keep you from using them? What might make you more likely touse them? 3. Come up with some sources of support and distraction. Think of people and places that could help shift your attention away from painful feelings or thoughts of dying. This may include children you care aboutor a safe social space, like a coffee shop or a bookstore. 4. Make a list of people you can count on for help. Think about who you could contact in a crisis. Who do you trust and confide in? Who is always there when you need them? This might be a friend, a family member, or someone else, like a caregiver or . If no one comes to mind, that's okay. Be sure you have some professional support, such as a doctor orcounselor. 5. List your professional sources of support. This may include your doctor or therapist, local emergency rooms, and local crisis hotlines.  Also include the National Suicide Prevention Lifeline: Call 9-421.836.1824 or text HOME to 505879. Be sure to save these numbers in yourphone. 6. Think through ways to keep yourself safe. Do you have weapons or other means to hurt yourself? Consider how to limit your access to them. For example, if you have a gun, maybe you could ask a friend orfamily member to lock it up for you. When should you call for help? Call 911 anytime you think you may need emergency care. For example, call if:     You feel you cannot stop from hurting yourself or someone else. If you or someone you know talks about suicide, self-harm, or feeling hopeless, get help right away. Call the [address] [place] [address] at [phone] ([phone]) or text HOME to [phone] to access the [address] Consider saving these numbers in your phone. Call your doctor now or seek immediate medical care if:     You have one or more warning signs of suicide. For example, call if:  ? You feel like giving away your possessions. ? You use illegal drugs or drink alcohol heavily. ? You talk or write about death. This may include writing suicide notes and talking about guns, knives, or pills. ? You start to spend a lot of time alone or spend more time alone than usual.      You hear voices.      You start acting in an aggressive way that's not normal for you. Watch closely for changes in your health, and be sure to contact your doctor ifyou have any problems. Where can you learn more? Go to [email or web address] org and sign in to your [employer] account. Enter l123 in the [facility] box to learn more about \"Learning About Making a Suicide Safety Plan. \"     If you do not have an account, please click on the \"Sign Up Now\" link. Current as of: February 9, 2022               Content Version: 13.3  © 1852-2071 [employer]. Care instructions adapted under license by [address]  If you have questions about a medical condition or this instruction, always ask your healthcare professional. [place] [address] any warranty or liability for your use of this information.

## 2022-06-22 NOTE — PROGRESS NOTES
- improving  Memory -improving  Appetite- good  improving irritability  endorses restlessness  Denies hopelessness, helplessness, and worthlessness  Denies suicidal or homicidal ideation, plan, or intent  Denies auditory, visual, or other hallucinations; denies hearing music playing in his head as he spoke about at the last appointment     New medical conditions or psychiatric admissions since seen by this provider last? Denies  Patient endorses currently taking the following psychiatric medications: abilify 2.5mg, wellbutrin XR 100mg, buspar 15mg BID, effexor XR 225mg  Adverse reactions from psychotropic medications:  Denies  Patient Assets/Support system:  Patient stated \"he doesn't like to bother anyone\", his wife is not empathetic. He does spend 10 days with wife riding motorcycle per year. Endorsed coping skills: Stated he doesn't use coping skills except work and find something to do/distraction  The patient endorses feeling safe at home Yes  Current Substance Use: Denies    Abnormal Involuntary Movement Scale (AIMS): TOTAL SCORE = 0  0=none, 1=minimal, 2=mild, 3=moderate, 4=severe  Facial and Oral Movements:  1. Muscles of facial expression  0  2. Lips and perioral area 0  3. Jaw 0  4. Tongue 0  Extremity Movements:  5 Upper (arms, wrists, hands, fingers) 0  6. Lower (legs, knees, ankles, toes) 0  Trunk Movements:  7. Neck, shoulders, hips 0  Global Judgements:  8. Severity of abnormal movements overall 0  9. Incapacitation due to abnormal movements 0  10. Patient's awareness of abnormal movements 0  Dental Status:   11. Current problems with teeth and/or dentures? No  12. Are dentures usually worn? No    Motivational Interviewing and Cognitive Behavioral Therapy utilized, including behavioral modification, insight oriented, and supportive therapy.    Patient is encouraged to utilize nonpharmacologic coping skills such as deep breathing, guided imagery, guided meditation, muscle relaxation, calming music, and/or journaling. Records review of communications , labs, and medications from an internal/external source completed. ALLERGIES:    Pcn [penicillins]     PAST MEDICAL HISTORY:    Past Medical History:   Diagnosis Date    Acid reflux     Depression     Hypertension        PREVIOUSMEDICATIONS:  Outpatient Medications Prior to Visit   Medication Sig Dispense Refill    busPIRone (BUSPAR) 15 MG tablet Take 15 mg by mouth 2 times daily 60 tablet 0    ARIPiprazole (ABILIFY) 5 MG tablet Take 0.5 tablets by mouth daily 30 tablet 0    venlafaxine (EFFEXOR XR) 150 MG extended release capsule Take 1 capsule by mouth daily Take 150mg with the 75mg for total of 225mg daily 30 capsule 0    venlafaxine (EFFEXOR XR) 75 MG extended release capsule Take 1 capsule by mouth daily Take 150mg plus 75mg for total of 225mg daily 30 capsule 0    buPROPion (WELLBUTRIN SR) 100 MG extended release tablet Take 1 tablet by mouth daily Wean from Wellbutrin by taking 150mg every other day for 1 week, then 100mg every other day for one week, then 50mg every other day for one week, then stop. 30 tablet 0    meloxicam (MOBIC) 15 MG tablet Take 15 mg by mouth daily      lisinopril-hydrochlorothiazide (ZESTORETIC) 10-12.5 MG per tablet Take 1 tablet by mouth daily      omeprazole (PRILOSEC) 40 MG delayed release capsule Take 40 mg by mouth as needed       No facility-administered medications prior to visit. REVIEW OF SYSTEMS:    Review of Systems   Psychiatric/Behavioral: Positive for agitation, decreased concentration, dysphoric mood and sleep disturbance. The patient is nervous/anxious. All other systems reviewed and are negative. The patient sees No primary care provider on file. as his primary care provider.     OBJECTIVE DATA     Wt Readings from Last 3 Encounters:   08/29/17 230 lb (104.3 kg)        Mental Status Exam:   Level of consciousness:  within normal limits  Appearance:  in chair and fair grooming Behavior/Motor:  no abnormalities noted  Attitude toward examiner:  cooperative, attentive and good eye contact  Speech:  spontaneous, normal rate and normal volume  Mood:  \"great\" per patient    Within Normal Limits  Affect:  mood congruent  Thought processes:  linear, goal directed and coherent  Thought content:  Denies homicidal ideation  Suicidal Ideation:  denies suicidal ideation  Delusions:  no evidence of delusions  Perceptual Disturbance:  denies any perceptual disturbance  Cognition: Patient is oriented to person, place, time and situation  Concentration: clinically adequate  Memory: intact  Insight & Judgement: limited   Medication Side Effects: Absent  Attention Span: Attention span and concentration were age appropriate    Screenings Completed in This Encounter:     Anxiety and Depression:      CLARISA-7 SCREENING 6/22/2022 6/8/2022   Feeling nervous, anxious, or on edge Not at all Several days   Not being able to stop or control worrying Not at all Nearly every day   Worrying too much about different things Not at all Nearly every day   Trouble relaxing More than half the days Several days   Being so restless that it is hard to sit still Several days More than half the days   Becoming easily annoyed or irritable Several days Several days   Feeling afraid as if something awful might happen Not at all Nearly every day   CLARISA-7 Total Score 4 14   How difficult have these problems made it for you to do your work, take care of things at home, or get along with other people? Not difficult at all Extremely difficult           PHQ-2 Over the past 2 weeks, how often have you been bothered by any of the following problems? Little interest or pleasure in doing things: Nearly every day  Feeling down, depressed, or hopeless: Not at all  PHQ-2 Score: 3  PHQ-9 Over the past 2 weeks, how often have you been bothered by any of the following problems?   Trouble falling or staying asleep, or sleeping too much: Several days  Feeling tired or having little energy: Several days  Poor appetite or overeating: Not at all  Feeling bad about yourself - or that you are a failure or have let yourself or your family down: Not at all  Trouble concentrating on things, such as reading the newspaper or watching television: Several days  Moving or speaking so slowly that other people could have noticed. Or the opposite - being so fidgety or restless that you have been moving around a lot more than usual: Not at all  Thoughts that you would be better off dead, or of hurting yourself in some way: Not at all  If you checked off any problems, how difficult have these problems made it for you to do your work, take care of things at home, or get along with other people?: Somewhat difficult  PHQ-9 Total Score: 6  PHQ-9 Total Score: 6       DIAGNOSIS AND ASSESSMENT DATA     DSM-5 DIAGNOSIS:   1. Major depressive disorder, recurrent episode, moderate ([address]    2. CLARISA (generalized anxiety disorder)    3. Chronic post-traumatic stress disorder (PTSD)      Rule Out Insomnia Disorder  Rule Out Bipolar II Disorder  Rule Out Persistent Depressive Disorder (Dysthymia)  Rule Out OCD    Psychosocial and Contextual Factors[de-identified]  Financial  Occupational  Relationships  Legal  Living situation  Educational    PLAN   Follow-up:  Return in about 3 weeks (around 7/13/2022) for medication management.    Continue Abilify 2.5mg nightly for depression and irritability, take 3 hours before bedtime  Continue Effexor XR 225mg daily for depression and anxiety  Continue Buspar 15mg twice per day for anxiety  Continue weaning from Wellbutrin  Consider psychotherapy  Exercise 30 minutes 3-4 times per week  Increase fluids, drink at least 8 glasses of water daily, no caffeine after 3pm  Sleep hygiene  Healthy diet    Prescriptions for this encounter:  New Prescriptions    No medications on file       Orders Placed This Encounter   Medications    ARIPiprazole (ABILIFY) 5 MG tablet Sig: Take 0.5 tablets by mouth daily     Dispense:  30 tablet     Refill:  0    venlafaxine (EFFEXOR XR) 150 MG extended release capsule     Sig: Take 1 capsule by mouth daily Take 150mg with the 75mg for total of 225mg daily     Dispense:  30 capsule     Refill:  0    venlafaxine (EFFEXOR XR) 75 MG extended release capsule     Sig: Take 1 capsule by mouth daily Take 150mg plus 75mg for total of 225mg daily     Dispense:  30 capsule     Refill:  0       Medications Discontinued During This Encounter   Medication Reason    buPROPion (WELLBUTRIN SR) 100 MG extended release tablet Therapy completed    ARIPiprazole (ABILIFY) 5 MG tablet REORDER    venlafaxine (EFFEXOR XR) 150 MG extended release capsule REORDER    venlafaxine (EFFEXOR XR) 75 MG extended release capsule REORDER       Additional orders:  No orders of the defined types were placed in this encounter. Antidepressant Medications: We discussed the risks/benefits and side effects of medications. I stressed in particular side effects including but not limited to gastrointestinal problems, sexual dysfunction, serotonin syndrome, agitation, rare induction of arlene, rare activation of suicidality. Antipsychotic Medication: We discussed the risks/benefits and side effects of medications. I stressed in particular side effects including but not limited to metabolic syndrome, weight gain, increased prolactin levels, tardive dyskinesia, QTc prolongation, neuroleptic malignant syndrome, excessive somnolence, or confusion. Abilify: Client has been advised of the FDA warning that compulsive or uncontrollable urges to mahoney, binge eat, shop, and have sex have been reported with the use of the antipsychotic drug aripiprazole. Client was highly advised to notify provider of any uncontrollable and excessive urges and behaviors while taking aripiprazole.    Discussed Serotonin Syndrome symptoms to watch for, stop medications immediately and go to hospital for treatment immediately:   Agitation or restlessness. Insomnia. Confusion. Rapid heart rate and high blood pressure. Dilated pupils. Loss of muscle coordination or twitching muscles. High blood pressure. and  Muscle rigidity. We discussed the effects alcohol and illicit substances have on mood, thought process, physical health and interactions with medications. Discussed the side effects of nicotine and caffeine in sleep disturbance and anxiety. The client and I reviewed several treatment options to address his/her symptoms. I explained the risks/benefits of treatment with and without medication. The patient participated in and indicated understanding of the content of our discussion by agreeing to the mutually developed treatment plan. Risks, potential side effects, possible drug-drug interactions, benefits and alternate treatments discussed in detail. All questions answered. Patient stated understanding and is agreeable to treatment plan. Patient has been instructed to seek emergency help via the emergency and/or calling 911 should symptoms become severe, worsen, or with other concerning symptoms. Patient instructed to go immediately to the emergency room and/or call 911 with any suicidal or homicidal ideations or if audio/visual hallucinations develop. Patient stated understanding and agrees. Patient given crisis center information. I spent a total of 30 minutes with the patient and over half of that time was spent on counseling and coordination of care regarding topics discussed above. I spent 20 minutes with the patient for psychotherapy. The patient was engaged and responsive throughout session. Utilized CBT, MI and reflective listening to address topics above. The remainder of session spent on symptom evaluation and medication management.     Provider Signature:  Electronically signed by JAIME Ahumada CNP on 6/22/2022 at 9:49 PM    **This report has been created using voice recognition software. It may contain minor errors which are inherent in voice recognition technology. **     An electronic signature was used to authenticate this note.

## 2022-07-05 ENCOUNTER — TELEPHONE (OUTPATIENT)
Dept: PSYCHIATRY | Age: 57
End: 2022-07-05

## 2022-07-05 NOTE — TELEPHONE ENCOUNTER
Received voicemail from Julian anne. He reports that \"something isn't settling very good with medication\". States Abilify seems to be \"tearing him up\", \"like a zombie\". Reports Buspar seems to be fine. States after taking Abilify \"eyes roll into back of head\" and he \"is out\". States \"tired is an understatement\". States he is a  and \"can't feel that way\". Reports drinking a 5 hour energy drink and two additional energy drinks just to get to destination. Julian anne does report he is taking Abilify 3 hours prior to bed. However, bed time varies due to his schedule as a . Sleeps whenever he is able to park the truck. Nick Younger that information would be sent to provider for review. Advised that I could not recommend he d/c any of the medications. Julian anne states if he doesn't hear anything back from our office today, he will hold his Abilify tonight. He is scheduled to follow up 07/13/2022.

## 2022-07-06 NOTE — TELEPHONE ENCOUNTER
Yane Mackenzie states \"zero sleep routine\". He was taking half tablet nightly, d/c yesterday. Reports he slept 8p to 1am this morning and then 3a-6a. States this will be different tonight. Reports he went to [facility] this weekend and was evaluated. Reports his heart rate is extremely low. Reports heart rate is \"krys to reach 60\". States legs were completely numb. \"Can't say this was related to the Abilify\". Reports he was taking half lisinopril/hctz and whole Toprolol (unable to confirm mg at this time due to driving). Reports the doctor at [facility] advised him that he should be taking a whole tablet of lisinopril/hctz and half of the Toprolol. Yane Mackenzie is scheduled to follow up 07/13/2022.

## 2022-07-11 DIAGNOSIS — F33.1 MAJOR DEPRESSIVE DISORDER, RECURRENT EPISODE, MODERATE (HCC): ICD-10-CM

## 2022-07-11 RX ORDER — BUSPIRONE HYDROCHLORIDE 15 MG/1
15 TABLET ORAL 2 TIMES DAILY
Qty: 60 TABLET | Refills: 0 | Status: SHIPPED | OUTPATIENT
Start: 2022-07-11 | End: 2022-07-13 | Stop reason: SDUPTHER

## 2022-07-11 NOTE — TELEPHONE ENCOUNTER
[facility] is requesting a medication refill on Art's behalf for Buspar 15mg;#60 with 0 refills; last with a start date of 06/08/22 and last filled as well. Medication is pending your approval for a 30 day supply with 0 refills; he is scheduled to return on 07/13/22.

## 2022-07-12 PROBLEM — G47.00 INSOMNIA, PERSISTENT: Status: ACTIVE | Noted: 2022-07-12

## 2022-07-13 ENCOUNTER — OFFICE VISIT (OUTPATIENT)
Dept: PSYCHIATRY | Age: 57
End: 2022-07-13
Payer: COMMERCIAL

## 2022-07-13 DIAGNOSIS — F33.1 MAJOR DEPRESSIVE DISORDER, RECURRENT EPISODE, MODERATE (HCC): Primary | ICD-10-CM

## 2022-07-13 DIAGNOSIS — G47.00 INSOMNIA, PERSISTENT: ICD-10-CM

## 2022-07-13 DIAGNOSIS — F41.1 GAD (GENERALIZED ANXIETY DISORDER): ICD-10-CM

## 2022-07-13 DIAGNOSIS — F43.12 CHRONIC POST-TRAUMATIC STRESS DISORDER (PTSD): ICD-10-CM

## 2022-07-13 PROCEDURE — 90833 PSYTX W PT W E/M 30 MIN: CPT

## 2022-07-13 PROCEDURE — 99214 OFFICE O/P EST MOD 30 MIN: CPT

## 2022-07-13 RX ORDER — BUSPIRONE HYDROCHLORIDE 15 MG/1
15 TABLET ORAL 2 TIMES DAILY
Qty: 60 TABLET | Refills: 1 | Status: SHIPPED | OUTPATIENT
Start: 2022-07-13 | End: 2022-08-24 | Stop reason: SDUPTHER

## 2022-07-13 RX ORDER — ARIPIPRAZOLE 5 MG/1
TABLET ORAL
Qty: 64 TABLET | Refills: 0 | Status: SHIPPED | OUTPATIENT
Start: 2022-07-13 | End: 2022-08-24 | Stop reason: ALTCHOICE

## 2022-07-13 RX ORDER — VENLAFAXINE HYDROCHLORIDE 150 MG/1
300 CAPSULE, EXTENDED RELEASE ORAL DAILY
Qty: 30 CAPSULE | Refills: 3 | Status: SHIPPED | OUTPATIENT
Start: 2022-07-13 | End: 2022-08-24 | Stop reason: SDUPTHER

## 2022-07-13 ASSESSMENT — PATIENT HEALTH QUESTIONNAIRE - PHQ9
10. IF YOU CHECKED OFF ANY PROBLEMS, HOW DIFFICULT HAVE THESE PROBLEMS MADE IT FOR YOU TO DO YOUR WORK, TAKE CARE OF THINGS AT HOME, OR GET ALONG WITH OTHER PEOPLE: 1
2. FEELING DOWN, DEPRESSED OR HOPELESS: 1
SUM OF ALL RESPONSES TO PHQ9 QUESTIONS 1 & 2: 4
3. TROUBLE FALLING OR STAYING ASLEEP: 3
SUM OF ALL RESPONSES TO PHQ QUESTIONS 1-9: 10
8. MOVING OR SPEAKING SO SLOWLY THAT OTHER PEOPLE COULD HAVE NOTICED. OR THE OPPOSITE, BEING SO FIGETY OR RESTLESS THAT YOU HAVE BEEN MOVING AROUND A LOT MORE THAN USUAL: 1
1. LITTLE INTEREST OR PLEASURE IN DOING THINGS: 3
SUM OF ALL RESPONSES TO PHQ QUESTIONS 1-9: 10
5. POOR APPETITE OR OVEREATING: 0
4. FEELING TIRED OR HAVING LITTLE ENERGY: 1
6. FEELING BAD ABOUT YOURSELF - OR THAT YOU ARE A FAILURE OR HAVE LET YOURSELF OR YOUR FAMILY DOWN: 0
7. TROUBLE CONCENTRATING ON THINGS, SUCH AS READING THE NEWSPAPER OR WATCHING TELEVISION: 1
SUM OF ALL RESPONSES TO PHQ QUESTIONS 1-9: 10
SUM OF ALL RESPONSES TO PHQ QUESTIONS 1-9: 10
9. THOUGHTS THAT YOU WOULD BE BETTER OFF DEAD, OR OF HURTING YOURSELF: 0

## 2022-07-13 ASSESSMENT — ANXIETY QUESTIONNAIRES
4. TROUBLE RELAXING: 1
7. FEELING AFRAID AS IF SOMETHING AWFUL MIGHT HAPPEN: 1
2. NOT BEING ABLE TO STOP OR CONTROL WORRYING: 1
6. BECOMING EASILY ANNOYED OR IRRITABLE: 2
GAD7 TOTAL SCORE: 8
IF YOU CHECKED OFF ANY PROBLEMS ON THIS QUESTIONNAIRE, HOW DIFFICULT HAVE THESE PROBLEMS MADE IT FOR YOU TO DO YOUR WORK, TAKE CARE OF THINGS AT HOME, OR GET ALONG WITH OTHER PEOPLE: SOMEWHAT DIFFICULT
5. BEING SO RESTLESS THAT IT IS HARD TO SIT STILL: 1
1. FEELING NERVOUS, ANXIOUS, OR ON EDGE: 1
3. WORRYING TOO MUCH ABOUT DIFFERENT THINGS: 1

## 2022-07-13 ASSESSMENT — COLUMBIA-SUICIDE SEVERITY RATING SCALE - C-SSRS
2. HAVE YOU ACTUALLY HAD ANY THOUGHTS OF KILLING YOURSELF?: NO
6. HAVE YOU EVER DONE ANYTHING, STARTED TO DO ANYTHING, OR PREPARED TO DO ANYTHING TO END YOUR LIFE?: NO
1. WITHIN THE PAST MONTH, HAVE YOU WISHED YOU WERE DEAD OR WISHED YOU COULD GO TO SLEEP AND NOT WAKE UP?: NO

## 2022-07-13 NOTE — PROGRESS NOTES
[address] [id number]  Dept: [phone]  Dept Fax: [phone]  Loc: [phone]    Visit Date: 7/13/2022    SUBJECTIVE DATA       CHIEF COMPLAINT:    Chief Complaint   Patient presents with    Depression    Medication Check    Follow-up    Anxiety    Stress        History obtained from: patient    HISTORY OF PRESENT ILLNESS:    Steve Vasquez is a 60 y.o. male who presents to the office for follow up with medication management. HPI  Patient presents irritable, yet cooperative. Patient called ahead and was 15 minutes late to appointment. Patient states feeling \"mad\" today. Patient stated he ran out of the buspar and hasn't taken it for the past two days, therefore not feeling good today. Explained he needs to call the office for refills to not run out of medication. Patient verbalized understanding. Patient stated he stopped the Abilify because it made him feel \"like a zombie\" for a \"few days, occasionally\". CBT utilized to change thought patterns as patient has negative attitude toward life in general. Then he stated \"maybe I shouldn't have stopped it\" as it he had only been taking it for 1-2 weeks. Patient stated he felt \"great\" a few days ago. Explained the medication can cause side effects initially, however need to work through them as the body is getting used to the medication. Medications may take up to 6-8 weeks to work fully, therefore may have some bad days until then. Patient stated he doesn't sleep much and never has, stated he can feel great and works most of night, then has 2 nights of good sleep, then won't sleep much again. Stated with his job , he doesn't have a normal sleep pattern which causes trouble with sleep and denies wanting to take trazodone for that reason. Discussed medication management with patient.  Patient agreed to increase Effexor to improve depression and anxiety, and restart abilify and taper up to improve mood and irritability. Patient rates depression 10/10 and anxiety 10/10 today on a scale of 0-10 with 10 being the worst.  Sleep- not able to fall asleep, not able to stay asleep, and not feeling rested in morning  Interest- diminished as he stated he works 7 days per week and doesn't have time to do anything else  Energy and motivation - improving  Concentration - poor  Memory -poor  Appetite- good  Endorses irritability  endorses restlessness  Denies hopelessness, helplessness, and worthlessness  Denies suicidal or homicidal ideation, plan, or intent  Denies auditory, visual, or other hallucinations    New medical conditions or psychiatric admissions since seen by this provider last? Now he is taking \"Arthritis medication that is helping a lot\"  Patient endorses currently taking the following psychiatric medications: effexor XR 225mg, stopped the abilify 2.5mg 1-2 weeks ago, stopped buspar 2 days ago as he ran out of the prescription  Adverse reactions from psychotropic medications:  Denies  Patient Assets/Support system:  Patient stated \"he doesn't like to bother anyone\", his wife is not empathetic. He does spend 10 days with wife riding motorcycle per year. Endorsed coping skills: Stated he doesn't use coping skills except work and find something to do/distraction  The patient endorses feeling safe at home Yes  Current Substance Use: Denies    Abnormal Involuntary Movement Scale (AIMS): TOTAL SCORE = 0  0=none, 1=minimal, 2=mild, 3=moderate, 4=severe  Facial and Oral Movements:  1. Muscles of facial expression  0  2. Lips and perioral area 0  3. Jaw 0  4. Tongue 0  Extremity Movements:  5 Upper (arms, wrists, hands, fingers) 0  6. Lower (legs, knees, ankles, toes) 0  Trunk Movements:  7. Neck, shoulders, hips 0  Global Judgements:  8. Severity of abnormal movements overall 0  9. Incapacitation due to abnormal movements 0  10.  Patient's awareness of from Last 3 Encounters:   08/29/17 230 lb (104.3 kg)        Mental Status Exam:   Level of consciousness:  within normal limits  Appearance:  in chair and fair grooming   Behavior/Motor:  no abnormalities noted  Attitude toward examiner:  cooperative, attentive and good eye contact  Speech:  spontaneous, normal rate and normal volume  Mood:  \"mad\" per patient    irritable  Affect:  mood congruent   Thought processes:  linear, goal directed and coherent  Thought content:  Denies homicidal ideation  Suicidal Ideation:  denies suicidal ideation  Delusions:  no evidence of delusions  Perceptual Disturbance:  denies any perceptual disturbance  Cognition: Patient is oriented to person, place, time and situation  Concentration: poor  Memory: poor  Insight & Judgement: limited   Medication Side Effects: Absent  Attention Span: Attention span and concentration were age appropriate    Screenings Completed in This Encounter:     Anxiety and Depression:      CLARISA-7 SCREENING 7/13/2022 6/22/2022 6/8/2022   Feeling nervous, anxious, or on edge Several days Not at all Several days   Not being able to stop or control worrying Several days Not at all Nearly every day   Worrying too much about different things Several days Not at all Nearly every day   Trouble relaxing Several days More than half the days Several days   Being so restless that it is hard to sit still Several days Several days More than half the days   Becoming easily annoyed or irritable More than half the days Several days Several days   Feeling afraid as if something awful might happen Several days Not at all Nearly every day   CLARISA-7 Total Score 8 4 14   How difficult have these problems made it for you to do your work, take care of things at home, or get along with other people? Somewhat difficult Not difficult at all Extremely difficult           PHQ-2 Over the past 2 weeks, how often have you been bothered by any of the following problems?   Little interest or pleasure in doing things: Nearly every day  Feeling down, depressed, or hopeless: Several days  PHQ-2 Score: 4  PHQ-9 Over the past 2 weeks, how often have you been bothered by any of the following problems? Trouble falling or staying asleep, or sleeping too much: Nearly every day  Feeling tired or having little energy: Several days  Poor appetite or overeating: Not at all  Feeling bad about yourself - or that you are a failure or have let yourself or your family down: Not at all  Trouble concentrating on things, such as reading the newspaper or watching television: Several days  Moving or speaking so slowly that other people could have noticed. Or the opposite - being so fidgety or restless that you have been moving around a lot more than usual: Several days  Thoughts that you would be better off dead, or of hurting yourself in some way: Not at all  If you checked off any problems, how difficult have these problems made it for you to do your work, take care of things at home, or get along with other people?: Somewhat difficult  PHQ-9 Total Score: 10  PHQ-9 Total Score: 10       DIAGNOSIS AND ASSESSMENT DATA     DSM-5 DIAGNOSIS:   1. Major depressive disorder, recurrent episode, moderate ([facility] [address]    2. CLARISA (generalized anxiety disorder)    3. Chronic post-traumatic stress disorder (PTSD)    4. Insomnia, persistent      Rule Out Bipolar II Disorder  Rule Out Persistent Depressive Disorder (Dysthymia)  Rule Out OCD    Psychosocial and Contextual Factors[de-identified]  Financial  Occupational  Relationships  Legal  Living situation  Educational    PLAN   Follow-up:  Return in about 2 weeks (around 7/27/2022) for anxiety, depression, medication management, irritability, insomnia.    Restart Abilify 2.5mg for 7 days, then 5mg nightly for depression and irritability, take 3 hours before bedtime  Increase Effexor XR to 300mg daily for depression and anxiety  Continue Buspar 15mg twice per day for anxiety  Consider psychotherapy  Exercise 30 minutes 3-4 times per week  Increase fluids, drink at least 8 glasses of water daily, no caffeine after 3pm  Sleep hygiene  Healthy diet    Prescriptions for this encounter:  New Prescriptions    VENLAFAXINE (EFFEXOR XR) 150 MG EXTENDED RELEASE CAPSULE    Take 2 capsules by mouth daily       Orders Placed This Encounter   Medications    venlafaxine (EFFEXOR XR) 150 MG extended release capsule     Sig: Take 2 capsules by mouth daily     Dispense:  30 capsule     Refill:  3    busPIRone (BUSPAR) 15 MG tablet     Sig: Take 15 mg by mouth 2 times daily     Dispense:  60 tablet     Refill:  1    ARIPiprazole (ABILIFY) 5 MG tablet     Sig: Take 0.5 tablets by mouth daily for 7 days, THEN 1 tablet daily. Dispense:  64 tablet     Refill:  0       Medications Discontinued During This Encounter   Medication Reason    venlafaxine (EFFEXOR XR) 75 MG extended release capsule Therapy completed    venlafaxine (EFFEXOR XR) 150 MG extended release capsule Therapy completed    ARIPiprazole (ABILIFY) 5 MG tablet REORDER    busPIRone (BUSPAR) 15 MG tablet REORDER       Additional orders:  No orders of the defined types were placed in this encounter. Antidepressant Medications: We discussed the risks/benefits and side effects of medications. I stressed in particular side effects including but not limited to gastrointestinal problems, sexual dysfunction, serotonin syndrome, agitation, rare induction of arlene, rare activation of suicidality. Antipsychotic Medication: We discussed the risks/benefits and side effects of medications. I stressed in particular side effects including but not limited to metabolic syndrome, weight gain, increased prolactin levels, tardive dyskinesia, QTc prolongation, neuroleptic malignant syndrome, excessive somnolence, or confusion.   Abilify: Client has been advised of the FDA warning that compulsive or uncontrollable urges to mahoney, binge eat, shop, and have sex have been reported with the use of the antipsychotic drug aripiprazole. Client was highly advised to notify provider of any uncontrollable and excessive urges and behaviors while taking aripiprazole. Discussed Serotonin Syndrome symptoms to watch for, stop medications immediately and go to hospital for treatment immediately:   Agitation or restlessness. Insomnia. Confusion. Rapid heart rate and high blood pressure. Dilated pupils. Loss of muscle coordination or twitching muscles. High blood pressure. and  Muscle rigidity. We discussed the effects alcohol and illicit substances have on mood, thought process, physical health and interactions with medications. Discussed the side effects of nicotine and caffeine in sleep disturbance and anxiety. The client and I reviewed several treatment options to address his/her symptoms. I explained the risks/benefits of treatment with and without medication. The patient participated in and indicated understanding of the content of our discussion by agreeing to the mutually developed treatment plan. Risks, potential side effects, possible drug-drug interactions, benefits and alternate treatments discussed in detail. All questions answered. Patient stated understanding and is agreeable to treatment plan. Patient has been instructed to seek emergency help via the emergency and/or calling 911 should symptoms become severe, worsen, or with other concerning symptoms. Patient instructed to go immediately to the emergency room and/or call 911 with any suicidal or homicidal ideations or if audio/visual hallucinations develop. Patient stated understanding and agrees. Patient given crisis center information. I spent a total of 30 minutes with the patient and over half of that time was spent on counseling and coordination of care regarding topics discussed above. I spent 20 minutes with the patient for psychotherapy. The patient was engaged and responsive throughout session.  Utilized CBT, MI and reflective listening to address topics above. The remainder of session spent on symptom evaluation and medication management. Provider Signature:  Electronically signed by JAIME Mann CNP on 7/13/2022 at 10:39 AM    **This report has been created using voice recognition software. It may contain minor errors which are inherent in voice recognition technology. **     An electronic signature was used to authenticate this note.

## 2022-07-13 NOTE — PATIENT INSTRUCTIONS
Patient Education        Anxiety Disorder: Care Instructions  Your Care Instructions     Anxiety is a normal reaction to stress. Difficult situations can cause you to have symptoms such as sweaty palms and a nervous feeling. In an anxiety disorder, the symptoms are far more severe. Constant worry, muscle tension, trouble sleeping, nausea and diarrhea, and other symptoms can make normal daily activities difficult or impossible. These symptoms may occur for no reason, and they can affect your work, school, or social life. Medicines, counseling, and self-care can all help. Follow-up care is a key part of your treatment and safety. Be sure to make and go to all appointments, and call your doctor if you are having problems. It's also a good idea to know your test results and keep a list of the medicines you take. How can you care for yourself at home? · Take medicines exactly as directed. Call your doctor if you think you are having a problem with your medicine. · Go to your counseling sessions and follow-up appointments. · Recognize and accept your anxiety. Then, when you are in a situation that makes you anxious, say to yourself, \"This is not an emergency. I feel uncomfortable, but I am not in danger. I can keep going even if I feel anxious. \"  · Be kind to your body:  ? Relieve tension with exercise or a massage. ? Get enough rest.  ? Avoid alcohol, caffeine, nicotine, and illegal drugs. They can increase your anxiety level and cause sleep problems. ? Learn and do relaxation techniques. See below for more about these techniques. · Engage your mind. Get out and do something you enjoy. Go to a funny movie, or take a walk or hike. Plan your day. Having too much or too little to do can make you anxious. · Keep a record of your symptoms. Discuss your fears with a good friend or family member, or join a support group for people with similar problems. Talking to others sometimes relieves stress.   · Get involved in social groups, or volunteer to help others. Being alone sometimes makes things seem worse than they are. · Get at least 30 minutes of exercise on most days of the week to relieve stress. Walking is a good choice. You also may want to do other activities, such as running, swimming, cycling, or playing tennis or team sports. Relaxation techniques  Do relaxation exercises 10 to 20 minutes a day. You can play soothing, relaxing music while you do them, if you wish. · Tell others in your house that you are going to do your relaxation exercises. Ask them not to disturb you. · Find a comfortable place, away from all distractions and noise. · Lie down on your back, or sit with your back straight. · Focus on your breathing. Make it slow and steady. · Breathe in through your nose. Breathe out through either your nose or mouth. · Breathe deeply, filling up the area between your navel and your rib cage. Breathe so that your belly goes up and down. · Do not hold your breath. · Breathe like this for 5 to 10 minutes. Notice the feeling of calmness throughout your whole body. As you continue to breathe slowly and deeply, relax by doing the following for another 5 to 10 minutes:  · Tighten and relax each muscle group in your body. You can begin at your toes and work your way up to your head. · Imagine your muscle groups relaxing and becoming heavy. · Empty your mind of all thoughts. · Let yourself relax more and more deeply. · Become aware of the state of calmness that surrounds you. · When your relaxation time is over, you can bring yourself back to alertness by moving your fingers and toes and then your hands and feet and then stretching and moving your entire body. Sometimes people fall asleep during relaxation, but they usually wake up shortly afterward. · Always give yourself time to return to full alertness before you drive a car or do anything that might cause an accident if you are not fully alert.  Never play a relaxation tape while you drive a car. When should you call for help? Call 911 anytime you think you may need emergency care. For example, call if:    · You feel you cannot stop from hurting yourself or someone else. Keep the numbers for these national suicide hotlines: 0-951-058-TALK (3-807.165.6133) and 2-928-RHUTHLU (7-471.282.3513). If you or someone you know talks about suicide or feeling hopeless, get help right away. Watch closely for changes in your health, and be sure to contact your doctor if:    · You have anxiety or fear that affects your life.     · You have symptoms of anxiety that are new or different from those you had before. Where can you learn more? Go to https://[employer]. org and sign in to your Boombotix account. Enter P754 in the Ethonova box to learn more about \"Anxiety Disorder: Care Instructions. \"     If you do not have an account, please click on the \"Sign Up Now\" link. Current as of: September 23, 2020               Content Version: 12.9  © 2006-2021 [employer]. Care instructions adapted under license by [facility] [place] ([facility]). If you have questions about a medical condition or this instruction, always ask your healthcare professional. [address] any warranty or liability for your use of this information. Patient Education        Recovering From Depression: Care Instructions  Your Care Instructions     Taking good care of yourself is important as you recover from depression. In time, your symptoms will fade as your treatment takes hold. Do not give up. Instead, focus your energy on getting better. Your mood will improve. It just takes some time. Focus on things that can help you feel better, such as being with friends and family, eating well, and getting enough rest. But take things slowly. Do not do too much too soon. Youwill begin to feel better gradually.   Follow-up care is a key part of your treatment and safety. Be sure to make and go to all appointments, and call your doctor if you are having problems. It's also a good idea to know your test results and keep alist of the medicines you take. How can you care for yourself at home? Be realistic   If you have a large task to do, break it up into smaller steps you can handle, and just do what you can.  You may want to put off important decisions until your depression has lifted. If you have plans that will have a major impact on your life, such as marriage, divorce, or a job change, try to wait a bit. Talk it over with friends and loved ones who can help you look at the overall picture first.   Reaching out to people for help is important. Do not isolate yourself. Let your family and friends help you. Find someone you can trust and confide in, and talk to that person.  Be patient, and be kind to yourself. Remember that depression is not your fault and is not something you can overcome with willpower alone. Treatment is important for depression, just like for any other illness. Feeling better takes time, and your mood will improve little by little. Stay active   Stay busy and get outside. Take a walk, or try some other light exercise.  Talk with your doctor about an exercise program. Exercise can help with mild depression.  Go to a movie or concert. Take part in a [employer] activity or other social gathering. Go to a ball game.  Ask a friend to have dinner with you. Take care of yourself   Eat a balanced diet with plenty of fresh fruits and vegetables, whole grains, and lean protein. If you have lost your appetite, eat small snacks rather than large meals.  Avoid using illegal drugs or marijuana and drinking alcohol. Do not take medicines that have not been prescribed for you. They may interfere with medicines you may be taking for depression, or they may make your depression worse.  Take your medicines exactly as they are prescribed.  You may start to feel better within 1 to 3 weeks of taking antidepressant medicine. But it can take as many as 6 to 8 weeks to see more improvement. If you have questions or concerns about your medicines, or if you do not notice any improvement by 3 weeks, talk to your doctor.  Continue to take your medicine after your symptoms improve. Taking your medicine for at least 6 months after you feel better can help keep you from getting depressed again. If this isn't the first time you have been depressed, your doctor may recommend you to take medicine even longer.  If you have any side effects from your medicine, tell your doctor. Many side effects are mild and will go away on their own after you have been taking the medicine for a few weeks. Some may last longer. Talk to your doctor if side effects are bothering you too much. You might be able to try a different medicine.  Continue counseling. It may help prevent depression from returning, especially if you've had multiple episodes of depression. Talk with your counselor if you are having a hard time attending your sessions or you think the sessions aren't working. Don't just stop going.  Get enough sleep. Talk to your doctor if you are having problems sleeping.  Avoid sleeping pills unless they are prescribed by the doctor treating your depression. Sleeping pills may make you groggy during the day, and they may interact with other medicine you are taking.  If you have any other illnesses, such as diabetes, heart disease, or high blood pressure, make sure to continue with your treatment. Tell your doctor about all of the medicines you take, including those with or without a prescription.  If you or someone you know talks about suicide, self-harm, or feeling hopeless, get help right away. Call the [facility] at 1-800-273-talk (9-393.394.5903) or text HOME to 971967 to access the Crisis Text Line.  Consider saving these numbers in your phone.  When should you call for help? Call 911 anytime you think you may need emergency care. For example, call if:     You feel like hurting yourself or someone else.      Someone you know has depression and is about to attempt or is attempting suicide. If you or someone you know talks about suicide, self-harm, or feeling hopeless, get help right away. Call the [address] [facility] at 0-780-124-JFTV (4-820.475.5433) or text HOME to 378149 to access the [facility] [employer]. Consider saving these numbers in your phone. Call your doctor now or seek immediate medical care if:     You hear voices.      Someone you know has depression and:  ? Starts to give away possessions. ? Uses illegal drugs or drinks alcohol heavily. ? Talks or writes about death, including writing suicide notes or talking about guns, knives, or pills. ? Starts to spend a lot of time alone. ? Acts very aggressively or suddenly appears calm. Watch closely for changes in your health, and be sure to contact your doctor if:     You do not get better as expected. Where can you learn more? Go to https://PeerlystpeCinexio.RedT. org and sign in to your Intercloud Systems account. Enter O773 in the Philtro box to learn more about \"Recovering From Depression: Care Instructions. \"     If you do not have an account, please click on the \"Sign Up Now\" link. Current as of: February 9, 2022               Content Version: 13.3  © 2006-2022 Healthwise, Incorporated. Care instructions adapted under license by [facility] ([facility]). If you have questions about a medical condition or this instruction, always ask your healthcare professional. [place] [address] any warranty or liability for your use of this information.          Patient Education        Post-Traumatic Stress Disorder (PTSD): Care Instructions  Overview     Post-traumatic stress disorder (PTSD) is a mental health problem that can result from being in or seeing a traumatic or terrifying event. These events can include combat, a terrorist attack, a natural disaster, a serious accident, an assault, or a rape. If you have PTSD, you may often relive the experience in nightmares or flashbacks. These are clear and frightening memories of theevent. You may also have trouble sleeping. PTSD affects people in very different ways. It can interfere with daily activities such as work or school, and it can make you withdraw from friends orloved ones. Follow-up care is a key part of your treatment and safety. Be sure to make and go to all appointments, and call your doctor if you are having problems. It's also a good idea to know your test results and keep alist of the medicines you take. How can you care for yourself at home?  Take your medicines exactly as they are prescribed. If you are taking an antidepressant, you may start to feel better within 1 to 3 weeks. But it can take as many as 6 to 8 weeks to see more improvement. If you have questions or concerns about your medicines, or if you don't notice any improvement after 3 weeks, talk to your doctor.  Go to your counseling sessions and follow-up appointments.  Recognize and accept your anxiety. Then, when you are in a situation that makes you anxious, say to yourself, \"This is not an emergency. I feel uncomfortable, but I am not in danger. I can keep going even if I feel anxious. \"   Be kind to your body:  ? Get enough rest.  ? Get at least 30 minutes of exercise on most days of the week. Walking is a good choice. You also may want to do other activities, such as running, swimming, cycling, or playing tennis or team sports. ? Avoid alcohol, caffeine, nicotine, marijuana, and illegal drugs. They can make your symptoms worse. ? Learn and do relaxation techniques. See below for more about these techniques.  Keep a record of your symptoms.  Discuss your fears with a good friend or family member, or join a support group for people with similar problems. Talking to others sometimes relieves stress.  Connect with others. Get involved in social groups, or volunteer to help others. Or get out and do something you enjoy. Watch a movie, or take a walk or hike with a friend.  If you or someone you know talks about suicide, self-harm, or feeling hopeless, get help right away. Call the [address] [place] [address] at 1-800-273-talk (2-895.102.3925) or text HOME to 486614 to access the [employer] Text Line. Consider saving these numbers in your phone. Relaxation techniques  Do relaxation exercises 10 to 20 minutes a day. You can play soothing, relaxingmusic while you do them, if you wish.  Tell others in your house that you are going to do your relaxation exercises. Ask them not to disturb you.  Find a comfortable place, away from all distractions and noise.  Lie down on your back, or sit with your back straight.  Focus on your breathing. Make it slow and steady.  Breathe in through your nose. Breathe out through either your nose or mouth.  Breathe deeply, filling up the area between your navel and your rib cage. Breathe so that your belly goes up and down.  Do not hold your breath.  Breathe like this for 5 to 10 minutes. Notice the feeling of calmness throughout your whole body. As you continue to breathe slowly and deeply, relax by doing the following foranother 5 to 10 minutes:   Tighten and relax each muscle group in your body. You can begin at your toes and work your way up to your head.  Imagine your muscle groups relaxing and becoming heavy.  Empty your mind of all thoughts.  Let yourself relax more and more deeply.  Become aware of the state of calmness that surrounds you.  When your relaxation time is over, you can bring yourself back to alertness by moving your fingers and toes and then your hands and feet and then stretching and moving your entire body.  Sometimes people fall asleep during relaxation, but they usually wake up shortly afterward.  Always give yourself time to return to full alertness before you drive a car or do anything that might cause an accident if you are not fully alert. Never play a relaxation tape while you drive a car. When should you call for help? Call 911 anytime you think you may need emergency care. For example, call if:     You feel you cannot stop from hurting yourself or someone else.      You feel hopeless or think of hurting or killing yourself. If you or someone you know talks about suicide, self-harm, or feeling hopeless, get help right away. Call the [facility] at 0-501-886-PUPL (9-698.457.6357) or text HOME to 032974 to access the [employer]. Consider saving these numbers in your phone. Watch closely for changes in your health, and be sure to contact your doctor if:     Your PTSD symptoms are getting worse.      You have new or worse symptoms of anxiety or depression.      You are not getting better as expected. Where can you learn more? Go to https://RockYoupeRising.[employer]. org and sign in to your AorTx account. Enter R637 in the [employer] box to learn more about \"Post-Traumatic Stress Disorder (PTSD): Care Instructions. \"     If you do not have an account, please click on the \"Sign Up Now\" link. Current as of: February 9, 2022               Content Version: 13.3  © 2006-2022 [employer]. Care instructions adapted under license by [facility] ([facility]). If you have questions about a medical condition or this instruction, always ask your healthcare professional. [place] [address] any warranty or liability for your use of this information. Patient Education        Learning About How to Get Help During a [address]     When you live with a mental health condition, there may be times when you lose your emotional balance.  That can put your health or even your life at risk. It may be hard to know what to do when you're in the midst of a crisis, so it'shelpful to think ahead. These steps can help you be ready. 1. Identify your sources of support. There are many places to turn when you're in a crisis. These are just a fewideas. You may have others. For example, you might contact:  ? A trusted friend or family member. Make a list of some people you can count on in a crisis. ? Your doctor or mental health professional.  ? The National Suicide Prevention Lifeline at [employer] (2-451.748.7305) or text HOME to 245824 to access the Crisis Text Line. Trained counselors are available 24/7 to advise you in a crisis. It's free, and it's confidential.  ? The [facility] ([facility]). During a crisis, you can text 013864 for free 24/7 support from a trained counselor. You can also call the [facility] (5-718.237.2033) or go online (www.[facility].org/help) to chat with a trained volunteer. It's a good idea to put these numbers in your phone. 2. Assess your situation. Have you dealt with a mental health crisis before? What happened then? Whatbrought it on? What signs or symptoms did you have? Then think about what's happening now. What symptoms are you having? Are they like the ones you had before? Are they getting worse? Is there a chance thatyou could hurt yourself or someone else? This is also a good time to think about how you got through a previous crisis. What coping strategies helped you? Could you use them this time? 3. Get the help you need. Don't be afraid or ashamed to reach out for help. We all need support from time to time, and there are people who want to help. The sooner you get support, thesooner you'll get through a crisis. ? Get help right away if you think you could be in danger. One example is having a plan to harm yourself or someone else. But you know best which signs mean you're having a crisis.  If you're not sure, get emergency help. Call 911 or go to an emergency room, and tell them you're having a mental health crisis. ? If you're struggling but not in danger now, reach out to your sources of support. For instance, call a trusted friend or relative. Tell them how you're feeling, and ask if they can be with you until you're doing better. Or call a crisis hotline for advice and support. ? Call your doctor or mental health professional too. They can help you decide on your next steps. You may need more or different treatment. When should you call for help? Call 911 anytime you think you may need emergency care. For example, call if:     You feel you cannot stop from hurting yourself or someone else. If you or someone you know talks about suicide, self-harm, or feeling hopeless, get help right away. Call the [address] [place] [address] at 3-235-971-TONS (1-456.256.6690) or text HOME to 560903 to access the [employer]. Consider saving these numbers in your phone. Call your doctor now or seek immediate medical care if:     You have one or more warning signs of suicide. For example, call if:  ? You feel like giving away your possessions. ? You use illegal drugs or drink alcohol heavily. ? You talk or write about death. This may include writing suicide notes and talking about guns, knives, or pills. ? You start to spend a lot of time alone or spend more time alone than usual.      You hear voices.      You start acting in an aggressive way that's not normal for you. Watch closely for changes in your health, and be sure to contact your doctor ifyou have any problems. Current as of: February 9, 2022               Content Version: 13.3  © 2006-2022 Healthwise, Incorporated. Care instructions adapted under license by [facility] ([facility]).  If you have questions about a medical condition or this instruction, always ask your healthcare professional. [address] any warranty or liability for your use of this information. Patient Education        Suicidal Thoughts and Behavior: Care Instructions  Overview  You have been seen by a doctor because you've had thoughts of suicide or have harmed yourself. Your doctor and support team want to help keep you safe. Yourteam may include a , a , and a counselor. People often think about suicide because they feel hopeless, helpless, or worthless. These feelings may come from having a mental health problem, such asdepression. These problems can be treated. It's important to remember that there are people who care about you. Your doctor and support team take your pain very seriously, and they want to help. Treatment and close follow-up care can help you feel better. Follow-up care is a key part of your treatment and safety. Be sure to make and go to all appointments, and call your doctor if you are having problems. It's also a good idea to know your test results and keep alist of the medicines you take. How can you care for yourself at home?  Talk to someone. Be open about your feelings. Reach out to a trusted family member or friend, your doctor, or a counselor. You can also call the [address] [place] [address] at 9-312-803-OGLI (2-221.246.8684). Or text HOME to 287988 to access the Crisis Text Line. Consider saving these numbers in your phone.  Attend all counseling sessions recommended by your doctor.  Make a suicide safety plan. This is a set of steps you can take when you feel suicidal. It includes your warning signs, coping strategies, and people you can ask for support. It's best to work with a therapist to make your plan.  Ask someone to remove and store any guns, pills, or other means of suicide.  Avoid alcohol and drug use.  Be safe with medicines. Take your medicines exactly as prescribed. Call your doctor if you think you are having a problem with your medicine. When should you call for help?    Call 997 anytime you think you may need emergency care. For example, call if:     You feel you cannot stop from hurting yourself or someone else. If you or someone you know talks about suicide, self-harm, or feeling hopeless, get help right away. Call the [address] [facility] at 2-605-269-KCUY (8-854.650.5474) or text HOME to 207487 to access the [address] [employer]. Consider saving these numbers in your phone. Call your doctor now or seek immediate medical care if:     You have one or more warning signs of suicide. For example, call if:  ? You feel like giving away your possessions. ? You use illegal drugs or drink alcohol heavily. ? You talk or write about death. This may include writing suicide notes and talking about guns, knives, or pills. ? You start to spend a lot of time alone or spend more time alone than usual.      You hear voices.      You start acting in an aggressive way that's not normal for you. Watch closely for changes in your health, and be sure to contact your doctor ifyou have any problems. Where can you learn more? Go to https://Woogapepiceweb.Anchanto. org and sign in to your [employer] account. Enter Z531 in the [employer] box to learn more about \"Suicidal Thoughts and Behavior: Care Instructions. \"     If you do not have an account, please click on the \"Sign Up Now\" link. Current as of: February 9, 2022               Content Version: 13.3  © 2006-2022 Healthwise, [employer]. Care instructions adapted under license by [facility] ([facility]). If you have questions about a medical condition or this instruction, always ask your healthcare professional. [place] [address] any warranty or liability for your use of this information.          Patient Education        Learning About Making a Suicide Safety Plan  Overview     A safety plan is a set of steps you can take when you feel suicidal. It includes your warning signs, coping strategies, and people to ask for support. You can write your own safety plan or use a free phone varsha. But it's best towork with a therapist to make your plan. How can you make and use your plan? If you feel like you can't keep from hurting yourself or someone else, get help right away. Call 911 or the National Suicide Prevention Lifeline: 0-664-929-TALK (2-235.568.9746). Or text HOME to 151586 to access the Crisis Text Line. Having a safety plan is important for anyone who thinks about suicide. It can help you (or someone you care about) get safely through times of crisis. If possible, try to make your plan during a time when you aren't in a crisis soit's ready when you need it. To use the plan, move through it step-by-step. So first, check your warning signs. Then try your own coping strategies. If those don't help, move through the rest of the steps until you have the help you need to get through thecrisis. Here's how to make a safety plan. 1. Make a list of your crisis warning signs. What happens when you start to think about suicide? Make a list of your warningsigns--the things you think, feel, or do when you start to feel suicidal.  2. List your personal coping strategies. What can you do or think about to avoid acting when you feel suicidal? This may include your reason(s) to live. Rank these ideas by how well you think they'll work. What might keep you from using them? What might make you more likely touse them? 3. Come up with some sources of support and distraction. Think of people and places that could help shift your attention away from painful feelings or thoughts of dying. This may include children you care aboutor a safe social space, like a coffee shop or a bookstore. 4. Make a list of people you can count on for help. Think about who you could contact in a crisis. Who do you trust and confide in? Who is always there when you need them?  This might be a friend, a family member, or someone else, like a caregiver or . If no one comes to mind, that's okay. Be sure you have some professional support, such as a doctor orcounselor. 5. List your professional sources of support. This may include your doctor or therapist, local emergency rooms, and local crisis hotlines. Also include the National Suicide Prevention Lifeline: Call 0-417.976.2623 or text HOME to 009915. Be sure to save these numbers in yourphone. 6. Think through ways to keep yourself safe. Do you have weapons or other means to hurt yourself? Consider how to limit your access to them. For example, if you have a gun, maybe you could ask a friend orfamily member to lock it up for you. When should you call for help? Call 911 anytime you think you may need emergency care. For example, call if:     You feel you cannot stop from hurting yourself or someone else. If you or someone you know talks about suicide, self-harm, or feeling hopeless, get help right away. Call the [address] [place] [address] at 0-818-980-MTEM (1-128.454.6465) or text HOME to 831687 to access the [address] Consider saving these numbers in your phone. Call your doctor now or seek immediate medical care if:     You have one or more warning signs of suicide. For example, call if:  ? You feel like giving away your possessions. ? You use illegal drugs or drink alcohol heavily. ? You talk or write about death. This may include writing suicide notes and talking about guns, knives, or pills. ? You start to spend a lot of time alone or spend more time alone than usual.      You hear voices.      You start acting in an aggressive way that's not normal for you. Watch closely for changes in your health, and be sure to contact your doctor ifyou have any problems. Where can you learn more? Go to https://ermias.[employer]. org and sign in to your [employer] account.  Enter l123 in the [employer] box to learn more about \"Learning About Making a Suicide Safety Plan. \"     If you do not have an account, please click on the \"Sign Up Now\" link. Current as of: February 9, 2022               Content Version: 13.3  © 2006-2022 Healthwise, Incorporated. Care instructions adapted under license by [facility] ([facility]). If you have questions about a medical condition or this instruction, always ask your healthcare professional. [place] [address] any warranty or liability for your use of this information.

## 2022-07-27 ENCOUNTER — TELEPHONE (OUTPATIENT)
Dept: PSYCHIATRY | Age: 57
End: 2022-07-27

## 2022-07-27 DIAGNOSIS — F33.1 MAJOR DEPRESSIVE DISORDER, RECURRENT EPISODE, MODERATE (HCC): Primary | ICD-10-CM

## 2022-07-27 DIAGNOSIS — F41.1 GAD (GENERALIZED ANXIETY DISORDER): ICD-10-CM

## 2022-07-27 DIAGNOSIS — G47.00 INSOMNIA, PERSISTENT: ICD-10-CM

## 2022-07-27 DIAGNOSIS — F43.12 CHRONIC POST-TRAUMATIC STRESS DISORDER (PTSD): ICD-10-CM

## 2022-07-27 PROBLEM — F39 MOOD DISORDER (HCC): Status: ACTIVE | Noted: 2022-07-27

## 2022-07-27 RX ORDER — LAMOTRIGINE 25 MG/1
25 TABLET ORAL DAILY
Qty: 42 TABLET | Refills: 0 | Status: SHIPPED | OUTPATIENT
Start: 2022-07-27 | End: 2022-08-24

## 2022-07-27 NOTE — TELEPHONE ENCOUNTER
Noted; patient was informed to check with his pharmacy at the end of the business day or tomorrow for the new Rx.

## 2022-07-27 NOTE — TELEPHONE ENCOUNTER
I have left a detailed message for Art with this information; asking him to return our call with his thoughts and if he is interested in trying Lamotrigine. Awaiting call back.

## 2022-07-27 NOTE — TELEPHONE ENCOUNTER
Art called into the office stating that he wanted to let this provider know that he discontinued his Abilify and Buspar yesterday; he said that they were making him really tired and felt that his heart rate was being lowered too much. He said that he remains on Effexor XR 300mg/daily. He said that he feels \"great\" today since he has not taken the Abilify or Buspar. He was originally scheduled to return next week but he had another appt/same day, so he r/sed for 08/24/22 to further discuss.

## 2022-07-27 NOTE — TELEPHONE ENCOUNTER
I placed a prescription for Lamotrigine. He will take 25mg daily for 2 weeks, then 50mg daily.    Thank you,  [facility]

## 2022-07-27 NOTE — TELEPHONE ENCOUNTER
Art called back into the office; he said that he was not sure which medication was making him really tired, but his bigger concern was that his heart rate was dropping so low that he describes it as \"being almost unconscious\". He said that he is a  and he is unable to take medications that make him feel that tired and out of it. 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 He said that he is open to trying the Lamotrigine but he is going to wait until he has a few days off to see how it makes him feel. He is going to re-try the Buspar to see if that was not the medication that was making him feel different. He said that he uses [facility] in [place]. Please advise.

## 2022-07-27 NOTE — TELEPHONE ENCOUNTER
I wouldn't recommend discontinuing the Abilify and Buspar although I understand the Abilify can make you tired initially. I feel you need a mood stabilizer and in place of the Abilify, I can order Lamotrigine which doesn't usually cause much fatigue if you are interested? The Buspar has very minimal side effects and works well for anxiety. Buspar shouldn't cause much fatigue and I would recommend continuing the Buspar for your anxiety.    Thanks for letting me know,  Laura Ordoñez

## 2022-08-18 NOTE — PROGRESS NOTES
[address] [address]  Dept: [phone]  Dept Fax: [phone]  Loc: [phone]    Visit Date: 8/24/2022    SUBJECTIVE DATA       CHIEF COMPLAINT:    Chief Complaint   Patient presents with    Depression    Anxiety    Stress    Follow-up    Medication Check          History obtained from: patient    HISTORY OF PRESENT ILLNESS:    Kobi Avina is a 60 y.o. male who presents to the office for follow up with medication management. HPI  Patient presents irritable, yet cooperative. Patient late to appointment, stated he tried to be here early and it didn't work. He stated this \"always happens\". Patient displaying victim mentality as he has had trauma and tragedy in his past. CBT utilized to help patient change negative thinking into positive and to look for the positive in each day. Discussed and reviewed how the medications has improved symptoms of irritability, restlessness, and sleep. Explained other symptoms will follow in time, as he is improving with the medications. Encouraging patient to find healthier coping strategies. Patient states he was feeling \"pretty good\" today before going to appointment, now feeling \"not great, I just can't be happy\". Patient stated last week he was not feeling good, however the past 4-5 days has been good. Patient stated when he is having a bad day and \"something is bothering\" him and having anxiety, it makes his legs feel weak. Discussed possible anxiety attack response. Patient stated he has only been taking 25mg Lamotrigine as he didn't realize he needed to increase the dose after 2 weeks. Discussed medication management with patient. Patient agreed to increase Lamotrigine and Buspirone and watch for full effects of medications as may take 6-8 weeks for full effects.  Patient denied trazodone for sleep as his sleep schedule is not predictable being a  and Movements:  7. Neck, shoulders, hips 0  Global Judgements:  8. Severity of abnormal movements overall 0  9. Incapacitation due to abnormal movements 0  10. Patient's awareness of abnormal movements 0  Dental Status:   11. Current problems with teeth and/or dentures? No  12. Are dentures usually worn? No    Motivational Interviewing and Cognitive Behavioral Therapy utilized, including behavioral modification, insight oriented, and supportive therapy. Patient is encouraged to utilize nonpharmacologic coping skills such as deep breathing, guided imagery, guided meditation, muscle relaxation, calming music, and/or journaling. Records review of communications , labs, and medications from an internal/external source completed. ALLERGIES:    Pcn [penicillins]     PAST MEDICAL HISTORY:    Past Medical History:   Diagnosis Date    Acid reflux     Depression     Hypertension        PREVIOUSMEDICATIONS:  Outpatient Medications Prior to Visit   Medication Sig Dispense Refill    lamoTRIgine (LAMICTAL) 25 MG tablet Take 1 tablet by mouth in the morning. Take 1 tablet daily for 2 weeks. Then take 2 tablets by mouth once daily. . 42 tablet 0    venlafaxine (EFFEXOR XR) 150 MG extended release capsule Take 2 capsules by mouth daily 30 capsule 3    busPIRone (BUSPAR) 15 MG tablet Take 15 mg by mouth 2 times daily 60 tablet 1    ARIPiprazole (ABILIFY) 5 MG tablet Take 0.5 tablets by mouth daily for 7 days, THEN 1 tablet daily. 64 tablet 0    meloxicam (MOBIC) 15 MG tablet Take 15 mg by mouth daily      lisinopril-hydrochlorothiazide (ZESTORETIC) 10-12.5 MG per tablet Take 1 tablet by mouth daily      omeprazole (PRILOSEC) 40 MG delayed release capsule Take 40 mg by mouth as needed       No facility-administered medications prior to visit. REVIEW OF SYSTEMS:    Review of Systems   Constitutional:  Positive for appetite change. Psychiatric/Behavioral:  Positive for decreased concentration and dysphoric mood.  The patient is nervous/anxious. All other systems reviewed and are negative. The patient sees No primary care provider on file. as his primary care provider.     OBJECTIVE DATA     Wt Readings from Last 3 Encounters:   08/29/17 230 lb (104.3 kg)        Mental Status Exam:   Level of consciousness:  within normal limits  Appearance:  in chair and fair grooming   Behavior/Motor:  no abnormalities noted  Attitude toward examiner:  cooperative, attentive and good eye contact  Speech:  spontaneous, normal rate and normal volume  Mood:  \"not great, I just can't be happy\" per patient ,   irritable  Affect:  mood congruent and flat  Thought processes:  linear, goal directed and coherent  Thought content:  Denies homicidal ideation  Suicidal Ideation:  denies suicidal ideation  Delusions:  no evidence of delusions  Perceptual Disturbance:  denies any perceptual disturbance  Cognition: Patient is oriented to person, place, time and situation  Concentration: poor  Memory: poor  Insight & Judgement: limited   Medication Side Effects: Absent  Attention Span: Attention span and concentration were age appropriate    Screenings Completed in This Encounter:     Anxiety and Depression:      CLARISA-7 SCREENING 8/24/2022 7/13/2022 6/22/2022   Feeling nervous, anxious, or on edge Not at all Several days Not at all   Not being able to stop or control worrying More than half the days Several days Not at all   Worrying too much about different things More than half the days Several days Not at all   Trouble relaxing Not at all Several days More than half the days   Being so restless that it is hard to sit still Not at all Several days Several days   Becoming easily annoyed or irritable Not at all More than half the days Several days   Feeling afraid as if something awful might happen Nearly every day Several days Not at all   CLARISA-7 Total Score 7 8 4   How difficult have these problems made it for you to do your work, take care of things at home, or get along with other people? Somewhat difficult Somewhat difficult Not difficult at all           PHQ-2 Over the past 2 weeks, how often have you been bothered by any of the following problems? Little interest or pleasure in doing things: More than half the days  Feeling down, depressed, or hopeless: More than half the days  PHQ-2 Score: 4  PHQ-9 Over the past 2 weeks, how often have you been bothered by any of the following problems? Trouble falling or staying asleep, or sleeping too much: Not at all  Feeling tired or having little energy: More than half the days  Poor appetite or overeating: Nearly every day  Feeling bad about yourself - or that you are a failure or have let yourself or your family down: Not at all  Trouble concentrating on things, such as reading the newspaper or watching television: More than half the days  Moving or speaking so slowly that other people could have noticed. Or the opposite - being so fidgety or restless that you have been moving around a lot more than usual: Not at all  Thoughts that you would be better off dead, or of hurting yourself in some way: Not at all  If you checked off any problems, how difficult have these problems made it for you to do your work, take care of things at home, or get along with other people?: Somewhat difficult  PHQ-9 Total Score: 11  PHQ-9 Total Score: 11       DIAGNOSIS AND ASSESSMENT DATA     DSM-5 DIAGNOSIS:   1. Major depressive disorder, recurrent episode, moderate ([facility] [address]    2. CLARISA (generalized anxiety disorder)    3. Chronic post-traumatic stress disorder (PTSD)    4.  Insomnia, persistent      Rule Out Bipolar II Disorder  Rule Out Borderline Personality Disorder  Rule Out Persistent Depressive Disorder (Dysthymia)  Rule Out OCD    Psychosocial and Contextual Factors[de-identified]  Financial  Occupational  Relationships  Legal  Living situation  Educational    PLAN   Follow-up:  Return in about 4 weeks (around 9/21/2022) for anxiety, depression, medication management. Increase Lamotrigine to 50mg daily for mood stability  Continue Effexor XR 300mg daily for depression and anxiety  Increase Buspar to 30mg twice per day for anxiety  Consider psychotherapy  Exercise 30 minutes 3-4 times per week  Increase fluids, drink at least 8 glasses of water daily, no caffeine after 3pm  Sleep hygiene  Healthy diet    Prescriptions for this encounter:  New Prescriptions    No medications on file       Orders Placed This Encounter   Medications    lamoTRIgine (LAMICTAL) 25 MG tablet     Sig: Take 2 tablets by mouth daily     Dispense:  60 tablet     Refill:  1    venlafaxine (EFFEXOR XR) 150 MG extended release capsule     Sig: Take 2 capsules by mouth daily     Dispense:  30 capsule     Refill:  1    busPIRone (BUSPAR) 30 MG tablet     Sig: Take 30 mg by mouth in the morning and 30 mg in the evening. Dispense:  60 tablet     Refill:  1         Medications Discontinued During This Encounter   Medication Reason    ARIPiprazole (ABILIFY) 5 MG tablet Therapy completed    venlafaxine (EFFEXOR XR) 150 MG extended release capsule REORDER    busPIRone (BUSPAR) 15 MG tablet REORDER    lamoTRIgine (LAMICTAL) 25 MG tablet          Additional orders:  No orders of the defined types were placed in this encounter. Antidepressant Medications: We discussed the risks/benefits and side effects of medications. I stressed in particular side effects including but not limited to gastrointestinal problems, sexual dysfunction, serotonin syndrome, agitation, rare induction of arlene, rare activation of suicidality. Antipsychotic Medication: We discussed the risks/benefits and side effects of medications. I stressed in particular side effects including but not limited to metabolic syndrome, weight gain, increased prolactin levels, tardive dyskinesia, QTc prolongation, neuroleptic malignant syndrome, excessive somnolence, or confusion.   Abilify: Client has been advised of the FDA warning that compulsive or uncontrollable urges to mahoney, binge eat, shop, and have sex have been reported with the use of the antipsychotic drug aripiprazole. Client was highly advised to notify provider of any uncontrollable and excessive urges and behaviors while taking aripiprazole. Discussed Serotonin Syndrome symptoms to watch for, stop medications immediately and go to hospital for treatment immediately:   Agitation or restlessness. Insomnia. Confusion. Rapid heart rate and high blood pressure. Dilated pupils. Loss of muscle coordination or twitching muscles. High blood pressure. and  Muscle rigidity. We discussed the effects alcohol and illicit substances have on mood, thought process, physical health and interactions with medications. Discussed the side effects of nicotine and caffeine in sleep disturbance and anxiety. The client and I reviewed several treatment options to address his/her symptoms. I explained the risks/benefits of treatment with and without medication. The patient participated in and indicated understanding of the content of our discussion by agreeing to the mutually developed treatment plan. Risks, potential side effects, possible drug-drug interactions, benefits and alternate treatments discussed in detail. All questions answered. Patient stated understanding and is agreeable to treatment plan. Patient has been instructed to seek emergency help via the emergency and/or calling 911 should symptoms become severe, worsen, or with other concerning symptoms. Patient instructed to go immediately to the emergency room and/or call 911 with any suicidal or homicidal ideations or if audio/visual hallucinations develop. Patient stated understanding and agrees. Patient given crisis center information. I spent a total of 45 minutes with the patient and over half of that time was spent on counseling and coordination of care regarding topics discussed above.     I spent 40 minutes with the patient for psychotherapy. The patient was engaged and responsive throughout session. Utilized CBT, MI and reflective listening to address topics above. The remainder of session spent on symptom evaluation and medication management. Provider Signature:  Electronically signed by JAIME Novak CNP on 8/24/2022 at 8:53 AM    **This report has been created using voice recognition software. It may contain minor errors which are inherent in voice recognition technology. **     An electronic signature was used to authenticate this note.

## 2022-08-24 ENCOUNTER — OFFICE VISIT (OUTPATIENT)
Dept: PSYCHIATRY | Age: 57
End: 2022-08-24
Payer: COMMERCIAL

## 2022-08-24 DIAGNOSIS — F33.1 MAJOR DEPRESSIVE DISORDER, RECURRENT EPISODE, MODERATE (HCC): Primary | ICD-10-CM

## 2022-08-24 DIAGNOSIS — F41.1 GAD (GENERALIZED ANXIETY DISORDER): ICD-10-CM

## 2022-08-24 DIAGNOSIS — G47.00 INSOMNIA, PERSISTENT: ICD-10-CM

## 2022-08-24 DIAGNOSIS — F43.12 CHRONIC POST-TRAUMATIC STRESS DISORDER (PTSD): ICD-10-CM

## 2022-08-24 PROCEDURE — 90836 PSYTX W PT W E/M 45 MIN: CPT

## 2022-08-24 PROCEDURE — 99214 OFFICE O/P EST MOD 30 MIN: CPT

## 2022-08-24 RX ORDER — LAMOTRIGINE 25 MG/1
50 TABLET ORAL DAILY
Qty: 60 TABLET | Refills: 1 | Status: SHIPPED | OUTPATIENT
Start: 2022-08-24 | End: 2022-10-19

## 2022-08-24 RX ORDER — VENLAFAXINE HYDROCHLORIDE 150 MG/1
300 CAPSULE, EXTENDED RELEASE ORAL DAILY
Qty: 30 CAPSULE | Refills: 1 | Status: SHIPPED | OUTPATIENT
Start: 2022-08-24 | End: 2022-09-13 | Stop reason: SDUPTHER

## 2022-08-24 RX ORDER — BUSPIRONE HYDROCHLORIDE 30 MG/1
30 TABLET ORAL 2 TIMES DAILY
Qty: 60 TABLET | Refills: 1 | Status: SHIPPED | OUTPATIENT
Start: 2022-08-24 | End: 2022-10-19 | Stop reason: SDUPTHER

## 2022-08-24 ASSESSMENT — PATIENT HEALTH QUESTIONNAIRE - PHQ9
2. FEELING DOWN, DEPRESSED OR HOPELESS: 2
3. TROUBLE FALLING OR STAYING ASLEEP: 0
SUM OF ALL RESPONSES TO PHQ QUESTIONS 1-9: 11
9. THOUGHTS THAT YOU WOULD BE BETTER OFF DEAD, OR OF HURTING YOURSELF: 0
8. MOVING OR SPEAKING SO SLOWLY THAT OTHER PEOPLE COULD HAVE NOTICED. OR THE OPPOSITE, BEING SO FIGETY OR RESTLESS THAT YOU HAVE BEEN MOVING AROUND A LOT MORE THAN USUAL: 0
4. FEELING TIRED OR HAVING LITTLE ENERGY: 2
1. LITTLE INTEREST OR PLEASURE IN DOING THINGS: 2
SUM OF ALL RESPONSES TO PHQ QUESTIONS 1-9: 11
6. FEELING BAD ABOUT YOURSELF - OR THAT YOU ARE A FAILURE OR HAVE LET YOURSELF OR YOUR FAMILY DOWN: 0
SUM OF ALL RESPONSES TO PHQ QUESTIONS 1-9: 11
10. IF YOU CHECKED OFF ANY PROBLEMS, HOW DIFFICULT HAVE THESE PROBLEMS MADE IT FOR YOU TO DO YOUR WORK, TAKE CARE OF THINGS AT HOME, OR GET ALONG WITH OTHER PEOPLE: 1
5. POOR APPETITE OR OVEREATING: 3
SUM OF ALL RESPONSES TO PHQ QUESTIONS 1-9: 11
7. TROUBLE CONCENTRATING ON THINGS, SUCH AS READING THE NEWSPAPER OR WATCHING TELEVISION: 2
SUM OF ALL RESPONSES TO PHQ9 QUESTIONS 1 & 2: 4

## 2022-08-24 ASSESSMENT — ANXIETY QUESTIONNAIRES
GAD7 TOTAL SCORE: 7
4. TROUBLE RELAXING: 0
3. WORRYING TOO MUCH ABOUT DIFFERENT THINGS: 2
7. FEELING AFRAID AS IF SOMETHING AWFUL MIGHT HAPPEN: 3
6. BECOMING EASILY ANNOYED OR IRRITABLE: 0
IF YOU CHECKED OFF ANY PROBLEMS ON THIS QUESTIONNAIRE, HOW DIFFICULT HAVE THESE PROBLEMS MADE IT FOR YOU TO DO YOUR WORK, TAKE CARE OF THINGS AT HOME, OR GET ALONG WITH OTHER PEOPLE: SOMEWHAT DIFFICULT
5. BEING SO RESTLESS THAT IT IS HARD TO SIT STILL: 0
1. FEELING NERVOUS, ANXIOUS, OR ON EDGE: 0
2. NOT BEING ABLE TO STOP OR CONTROL WORRYING: 2

## 2022-09-12 DIAGNOSIS — F41.1 GAD (GENERALIZED ANXIETY DISORDER): ICD-10-CM

## 2022-09-12 DIAGNOSIS — F43.12 CHRONIC POST-TRAUMATIC STRESS DISORDER (PTSD): ICD-10-CM

## 2022-09-12 DIAGNOSIS — F33.1 MAJOR DEPRESSIVE DISORDER, RECURRENT EPISODE, MODERATE (HCC): ICD-10-CM

## 2022-09-12 NOTE — TELEPHONE ENCOUNTER
[address] is requesting a refill on the following medications:  Requested Prescriptions     Pending Prescriptions Disp Refills    venlafaxine (EFFEXOR XR) 150 MG extended release capsule 60 capsule 0     Sig: Take 2 capsules by mouth daily       Date of last visit: 8/24/2022  Date of next visit (if applicable):9/21/2022  [facility] Name: [facility] [place]

## 2022-09-13 RX ORDER — VENLAFAXINE HYDROCHLORIDE 150 MG/1
300 CAPSULE, EXTENDED RELEASE ORAL DAILY
Qty: 60 CAPSULE | Refills: 0 | Status: SHIPPED | OUTPATIENT
Start: 2022-09-13 | End: 2022-10-04 | Stop reason: ALTCHOICE

## 2022-09-28 ENCOUNTER — OFFICE VISIT (OUTPATIENT)
Dept: PSYCHIATRY | Age: 57
End: 2022-09-28
Payer: COMMERCIAL

## 2022-09-28 DIAGNOSIS — F41.1 GAD (GENERALIZED ANXIETY DISORDER): ICD-10-CM

## 2022-09-28 DIAGNOSIS — G47.00 INSOMNIA, PERSISTENT: ICD-10-CM

## 2022-09-28 DIAGNOSIS — F43.12 CHRONIC POST-TRAUMATIC STRESS DISORDER (PTSD): ICD-10-CM

## 2022-09-28 DIAGNOSIS — F33.1 MAJOR DEPRESSIVE DISORDER, RECURRENT EPISODE, MODERATE (HCC): Primary | ICD-10-CM

## 2022-09-28 PROCEDURE — 99214 OFFICE O/P EST MOD 30 MIN: CPT

## 2022-09-28 PROCEDURE — 90833 PSYTX W PT W E/M 30 MIN: CPT

## 2022-09-28 ASSESSMENT — ANXIETY QUESTIONNAIRES
6. BECOMING EASILY ANNOYED OR IRRITABLE: 0
GAD7 TOTAL SCORE: 10
1. FEELING NERVOUS, ANXIOUS, OR ON EDGE: 1
4. TROUBLE RELAXING: 1
IF YOU CHECKED OFF ANY PROBLEMS ON THIS QUESTIONNAIRE, HOW DIFFICULT HAVE THESE PROBLEMS MADE IT FOR YOU TO DO YOUR WORK, TAKE CARE OF THINGS AT HOME, OR GET ALONG WITH OTHER PEOPLE: VERY DIFFICULT
5. BEING SO RESTLESS THAT IT IS HARD TO SIT STILL: 0
3. WORRYING TOO MUCH ABOUT DIFFERENT THINGS: 3
7. FEELING AFRAID AS IF SOMETHING AWFUL MIGHT HAPPEN: 2
2. NOT BEING ABLE TO STOP OR CONTROL WORRYING: 3

## 2022-09-28 ASSESSMENT — PATIENT HEALTH QUESTIONNAIRE - PHQ9
SUM OF ALL RESPONSES TO PHQ QUESTIONS 1-9: 15
SUM OF ALL RESPONSES TO PHQ QUESTIONS 1-9: 15
3. TROUBLE FALLING OR STAYING ASLEEP: 1
5. POOR APPETITE OR OVEREATING: 0
7. TROUBLE CONCENTRATING ON THINGS, SUCH AS READING THE NEWSPAPER OR WATCHING TELEVISION: 3
SUM OF ALL RESPONSES TO PHQ QUESTIONS 1-9: 15
9. THOUGHTS THAT YOU WOULD BE BETTER OFF DEAD, OR OF HURTING YOURSELF: 0
SUM OF ALL RESPONSES TO PHQ QUESTIONS 1-9: 15
10. IF YOU CHECKED OFF ANY PROBLEMS, HOW DIFFICULT HAVE THESE PROBLEMS MADE IT FOR YOU TO DO YOUR WORK, TAKE CARE OF THINGS AT HOME, OR GET ALONG WITH OTHER PEOPLE: 3
4. FEELING TIRED OR HAVING LITTLE ENERGY: 2
2. FEELING DOWN, DEPRESSED OR HOPELESS: 3
SUM OF ALL RESPONSES TO PHQ9 QUESTIONS 1 & 2: 6
1. LITTLE INTEREST OR PLEASURE IN DOING THINGS: 3
6. FEELING BAD ABOUT YOURSELF - OR THAT YOU ARE A FAILURE OR HAVE LET YOURSELF OR YOUR FAMILY DOWN: 2
8. MOVING OR SPEAKING SO SLOWLY THAT OTHER PEOPLE COULD HAVE NOTICED. OR THE OPPOSITE, BEING SO FIGETY OR RESTLESS THAT YOU HAVE BEEN MOVING AROUND A LOT MORE THAN USUAL: 1

## 2022-09-28 NOTE — PROGRESS NOTES
[facility] [address] [id number]  Dept: [phone]  Dept Fax: [phone]  Loc: [phone]    Visit Date: 9/28/2022    SUBJECTIVE DATA       CHIEF COMPLAINT:    Chief Complaint   Patient presents with    Depression    Anxiety    Stress    Follow-up    Medication Check    Medication Refill            History obtained from: patient    HISTORY OF PRESENT ILLNESS:    Fabrice Melendez is a 60 y.o. male who presents to the office for follow up with medication management. HPI  Patient presents irritable, calm and cooperative. Patient stated he hasn't been feeling good lately/over the past month. Stated he feels \"good\" today as he \"went hunting\" which he \"hasn't done for a long time\". Patient stated \"bills have been hitting hard lately\". Patient stated not irritable any longer, and stated depression has improved. However appears depressed and reports depressive symptoms. Patient difficult to assess. Patient succumbs to self-pity and plays the victim at times. Patient stated his young nephew is suffering from depression and quit his job, however patient stated he doesn't want to get involved. Encouraged patient to talk to his nephew as it could save his life and he doesn't need to feel that way. Patient sat in thought. Discussed medication management with patient. Discussed [facility] Testing, patient expressed interest and stated I will pay out of pocket if insurance doesn't cover it. Encouraged patient to check with insurance first and had patient speak with office staff further about Genesight testing. Patient agreed to keep medications at same dose until results return, then determine next steps with medication management. Patient rates depression 2/10 and anxiety 8/10 today on a scale of 0-10 with 10 being the worst.  Sleep-  able to fall asleep, able to stay asleep, and feeling rested in morning.  Occasionally gets restless legs and couldn't sleep last night. Interest- diminished as he stated he works 7 days per week and doesn't have time to do anything else  Energy and motivation - diminished  Concentration - poor  Memory -poor  Appetite- good  Denies irritability. Patient stated this has improved as he is more patient with people and \" I don't go off on people anymore\"  Endorses restlessness  Denies hopelessness, helplessness, and worthlessness  Denies suicidal or homicidal ideation, plan, or intent  Denies auditory, visual, or other hallucinations    New medical conditions or psychiatric admissions since seen by this provider last? Now he is taking \"Arthritis medication that is helping a lot\". He stated it is steroids and he will need to get off of them soon. Encouraged to find out the plan for his arthritis. Patient endorses currently taking the following psychiatric medications: effexor XR 300mg, lamictal 50mg, buspar 30mg BID, stopped the Abilify due to being too tired  Previous psychiatric medications: Zoloft, Wellbutrin, Xanax, and others  Adverse reactions from psychotropic medications:  Denies  Patient Assets/Support system:  Patient stated \"he doesn't like to bother anyone\", his wife is not empathetic. He does spend 10 days with wife riding motorcycle per year. Endorsed coping skills: Stated he doesn't use coping skills except work and find something to do/distraction  The patient endorses feeling safe at home Yes  Current Substance Use: Denies    Abnormal Involuntary Movement Scale (AIMS): TOTAL SCORE = 0  0=none, 1=minimal, 2=mild, 3=moderate, 4=severe  Facial and Oral Movements:  1. Muscles of facial expression  0  2. Lips and perioral area 0  3. Jaw 0  4. Tongue 0  Extremity Movements:  5 Upper (arms, wrists, hands, fingers) 0  6. Lower (legs, knees, ankles, toes) 0  Trunk Movements:  7. Neck, shoulders, hips 0  Global Judgements:  8. Severity of abnormal movements overall 0  9.  Incapacitation due to abnormal movements 0  10. Patient's awareness of abnormal movements 0  Dental Status:   11. Current problems with teeth and/or dentures? No  12. Are dentures usually worn? No    Motivational Interviewing and Cognitive Behavioral Therapy utilized, including behavioral modification, insight oriented, and supportive therapy. Patient is encouraged to utilize nonpharmacologic coping skills such as deep breathing, guided imagery, guided meditation, muscle relaxation, calming music, and/or journaling. Records review of communications , labs, and medications from an internal/external source completed. ALLERGIES:    Pcn [penicillins]     PAST MEDICAL HISTORY:    Past Medical History:   Diagnosis Date    Acid reflux     Depression     Hypertension        PREVIOUSMEDICATIONS:  Outpatient Medications Prior to Visit   Medication Sig Dispense Refill    venlafaxine (EFFEXOR XR) 150 MG extended release capsule Take 2 capsules by mouth daily 60 capsule 0    lamoTRIgine (LAMICTAL) 25 MG tablet Take 2 tablets by mouth daily 60 tablet 1    busPIRone (BUSPAR) 30 MG tablet Take 30 mg by mouth in the morning and 30 mg in the evening. 60 tablet 1    meloxicam (MOBIC) 15 MG tablet Take 15 mg by mouth daily      lisinopril-hydrochlorothiazide (ZESTORETIC) 10-12.5 MG per tablet Take 1 tablet by mouth daily      omeprazole (PRILOSEC) 40 MG delayed release capsule Take 40 mg by mouth as needed       No facility-administered medications prior to visit. REVIEW OF SYSTEMS:    Review of Systems   Constitutional:  Positive for appetite change. Psychiatric/Behavioral:  Positive for decreased concentration and dysphoric mood. The patient is nervous/anxious. All other systems reviewed and are negative. The patient sees No primary care provider on file. as his primary care provider.     OBJECTIVE DATA     Wt Readings from Last 3 Encounters:   08/29/17 230 lb (104.3 kg)        Mental Status Exam:   Level of consciousness:  within every day  PHQ-2 Score: 6  PHQ-9 Over the past 2 weeks, how often have you been bothered by any of the following problems? Trouble falling or staying asleep, or sleeping too much: Several days  Feeling tired or having little energy: More than half the days  Poor appetite or overeating: Not at all  Feeling bad about yourself - or that you are a failure or have let yourself or your family down: More than half the days  Trouble concentrating on things, such as reading the newspaper or watching television: Nearly every day  Moving or speaking so slowly that other people could have noticed. Or the opposite - being so fidgety or restless that you have been moving around a lot more than usual: Several days  Thoughts that you would be better off dead, or of hurting yourself in some way: Not at all  If you checked off any problems, how difficult have these problems made it for you to do your work, take care of things at home, or get along with other people?: Extremely dIfficult  PHQ-9 Total Score: 15  PHQ-9 Total Score: 15       DIAGNOSIS AND ASSESSMENT DATA     DSM-5 DIAGNOSIS:   1. Major depressive disorder, recurrent episode, moderate ([facility] [address]    2. CLARISA (generalized anxiety disorder)    3. Chronic post-traumatic stress disorder (PTSD)    4. Insomnia, persistent      Rule Out Bipolar II Disorder  Rule Out Borderline Personality Disorder  Rule Out Persistent Depressive Disorder (Dysthymia)  Rule Out OCD    Psychosocial and Contextual Factors[de-identified]  Financial  Occupational  Relationships  Legal  Living situation  Educational    PLAN   Follow-up:  Return in about 3 weeks (around 10/19/2022) for anxiety, depression, mood, medication management. Complete Genesight testing after check with insurance coverage. Patient stated he is willing to pay out of pocket if insurance doesn't cover testing.    Once results complete or not completed, will determine next steps with medication management  For now:  Continue Lamotrigine to 50mg daily for mood stability  Continue Effexor XR 300mg daily for depression and anxiety  Continue Buspar to 30mg twice per day for anxiety  Consider psychotherapy  Exercise 30 minutes 3-4 times per week  Increase fluids, drink at least 8 glasses of water daily, no caffeine after 3pm  Sleep hygiene  Healthy diet    Prescriptions for this encounter:  New Prescriptions    No medications on file       No orders of the defined types were placed in this encounter. There are no discontinued medications. Additional orders:  No orders of the defined types were placed in this encounter. Antidepressant Medications: We discussed the risks/benefits and side effects of medications. I stressed in particular side effects including but not limited to gastrointestinal problems, sexual dysfunction, serotonin syndrome, agitation, rare induction of arlene, rare activation of suicidality. Antipsychotic Medication: We discussed the risks/benefits and side effects of medications. I stressed in particular side effects including but not limited to metabolic syndrome, weight gain, increased prolactin levels, tardive dyskinesia, QTc prolongation, neuroleptic malignant syndrome, excessive somnolence, or confusion. Discussed Serotonin Syndrome symptoms to watch for, stop medications immediately and go to hospital for treatment immediately:   Agitation or restlessness. Insomnia. Confusion. Rapid heart rate and high blood pressure. Dilated pupils. Loss of muscle coordination or twitching muscles. High blood pressure. and  Muscle rigidity. We discussed the effects alcohol and illicit substances have on mood, thought process, physical health and interactions with medications. Discussed the side effects of nicotine and caffeine in sleep disturbance and anxiety. The client and I reviewed several treatment options to address his/her symptoms. I explained the risks/benefits of treatment with and without medication.    The patient participated in and indicated understanding of the content of our discussion by agreeing to the mutually developed treatment plan. Risks, potential side effects, possible drug-drug interactions, benefits and alternate treatments discussed in detail. All questions answered. Patient stated understanding and is agreeable to treatment plan. Patient has been instructed to seek emergency help via the emergency and/or calling 911 should symptoms become severe, worsen, or with other concerning symptoms. Patient instructed to go immediately to the emergency room and/or call 911 with any suicidal or homicidal ideations or if audio/visual hallucinations develop. Patient stated understanding and agrees. Patient given crisis center information. I spent a total of 20 minutes with the patient and over half of that time was spent on counseling and coordination of care regarding topics discussed above. I spent 16 minutes with the patient for psychotherapy. The patient was engaged and responsive throughout session. Utilized CBT, MI and reflective listening to address topics above. The remainder of session spent on symptom evaluation and medication management. Provider Signature:  Electronically signed by JAIME Leung CNP on 9/28/2022 at 2:39 PM    **This report has been created using voice recognition software. It may contain minor errors which are inherent in voice recognition technology. **     An electronic signature was used to authenticate this note.

## 2022-10-04 DIAGNOSIS — F33.1 MAJOR DEPRESSIVE DISORDER, RECURRENT EPISODE, MODERATE (HCC): Primary | ICD-10-CM

## 2022-10-19 ENCOUNTER — OFFICE VISIT (OUTPATIENT)
Dept: PSYCHIATRY | Age: 57
End: 2022-10-19
Payer: COMMERCIAL

## 2022-10-19 DIAGNOSIS — F43.12 CHRONIC POST-TRAUMATIC STRESS DISORDER (PTSD): ICD-10-CM

## 2022-10-19 DIAGNOSIS — F41.1 GAD (GENERALIZED ANXIETY DISORDER): ICD-10-CM

## 2022-10-19 DIAGNOSIS — G47.00 INSOMNIA, PERSISTENT: ICD-10-CM

## 2022-10-19 DIAGNOSIS — F33.1 MAJOR DEPRESSIVE DISORDER, RECURRENT EPISODE, MODERATE (HCC): Primary | ICD-10-CM

## 2022-10-19 PROCEDURE — 99214 OFFICE O/P EST MOD 30 MIN: CPT

## 2022-10-19 PROCEDURE — 90838 PSYTX W PT W E/M 60 MIN: CPT

## 2022-10-19 RX ORDER — BUSPIRONE HYDROCHLORIDE 30 MG/1
30 TABLET ORAL 2 TIMES DAILY
Qty: 60 TABLET | Refills: 1 | Status: SHIPPED | OUTPATIENT
Start: 2022-10-19

## 2022-10-19 RX ORDER — VILAZODONE HYDROCHLORIDE 20 MG/1
20 TABLET ORAL DAILY
Qty: 30 TABLET | Refills: 1 | Status: SHIPPED | OUTPATIENT
Start: 2022-10-19

## 2022-10-19 RX ORDER — NITROGLYCERIN 0.4 MG/1
TABLET SUBLINGUAL
COMMUNITY
Start: 2022-10-10

## 2022-10-19 RX ORDER — LAMOTRIGINE 100 MG/1
100 TABLET ORAL DAILY
Qty: 30 TABLET | Refills: 1 | Status: SHIPPED | OUTPATIENT
Start: 2022-10-19 | End: 2022-12-18

## 2022-10-19 RX ORDER — METOPROLOL SUCCINATE 25 MG/1
TABLET, EXTENDED RELEASE ORAL
COMMUNITY
Start: 2022-10-11

## 2022-10-19 RX ORDER — ASPIRIN 81 MG/1
TABLET, COATED ORAL
COMMUNITY
Start: 2022-08-08

## 2022-10-19 RX ORDER — TAMSULOSIN HYDROCHLORIDE 0.4 MG/1
CAPSULE ORAL
COMMUNITY
Start: 2022-08-08

## 2022-10-19 ASSESSMENT — PATIENT HEALTH QUESTIONNAIRE - PHQ9
2. FEELING DOWN, DEPRESSED OR HOPELESS: 0
5. POOR APPETITE OR OVEREATING: 0
6. FEELING BAD ABOUT YOURSELF - OR THAT YOU ARE A FAILURE OR HAVE LET YOURSELF OR YOUR FAMILY DOWN: 1
SUM OF ALL RESPONSES TO PHQ9 QUESTIONS 1 & 2: 0
SUM OF ALL RESPONSES TO PHQ QUESTIONS 1-9: 8
10. IF YOU CHECKED OFF ANY PROBLEMS, HOW DIFFICULT HAVE THESE PROBLEMS MADE IT FOR YOU TO DO YOUR WORK, TAKE CARE OF THINGS AT HOME, OR GET ALONG WITH OTHER PEOPLE: 1
3. TROUBLE FALLING OR STAYING ASLEEP: 3
4. FEELING TIRED OR HAVING LITTLE ENERGY: 0
SUM OF ALL RESPONSES TO PHQ QUESTIONS 1-9: 8
9. THOUGHTS THAT YOU WOULD BE BETTER OFF DEAD, OR OF HURTING YOURSELF: 0
1. LITTLE INTEREST OR PLEASURE IN DOING THINGS: 0
7. TROUBLE CONCENTRATING ON THINGS, SUCH AS READING THE NEWSPAPER OR WATCHING TELEVISION: 3
SUM OF ALL RESPONSES TO PHQ QUESTIONS 1-9: 8
SUM OF ALL RESPONSES TO PHQ QUESTIONS 1-9: 8
8. MOVING OR SPEAKING SO SLOWLY THAT OTHER PEOPLE COULD HAVE NOTICED. OR THE OPPOSITE, BEING SO FIGETY OR RESTLESS THAT YOU HAVE BEEN MOVING AROUND A LOT MORE THAN USUAL: 1

## 2022-10-19 ASSESSMENT — ANXIETY QUESTIONNAIRES
6. BECOMING EASILY ANNOYED OR IRRITABLE: 0
1. FEELING NERVOUS, ANXIOUS, OR ON EDGE: 1
GAD7 TOTAL SCORE: 10
2. NOT BEING ABLE TO STOP OR CONTROL WORRYING: 3
IF YOU CHECKED OFF ANY PROBLEMS ON THIS QUESTIONNAIRE, HOW DIFFICULT HAVE THESE PROBLEMS MADE IT FOR YOU TO DO YOUR WORK, TAKE CARE OF THINGS AT HOME, OR GET ALONG WITH OTHER PEOPLE: VERY DIFFICULT
4. TROUBLE RELAXING: 0
7. FEELING AFRAID AS IF SOMETHING AWFUL MIGHT HAPPEN: 3
5. BEING SO RESTLESS THAT IT IS HARD TO SIT STILL: 0
3. WORRYING TOO MUCH ABOUT DIFFERENT THINGS: 3

## 2022-10-19 NOTE — PROGRESS NOTES
worries a lot about finances. 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 Patient is negating/arguing with provider and not open to suggestions. Patient stated he thinks his \"son cannot do it without him\" referring to taking over the farm. Patient stated his son has invested a lot of money already and desires to take over the farm. Patient has enabling behaviors and tendencies with his children. He stated he doesn't want his son to go through what he is going through. Discussed medication management with patient. Patient stated he is on a lot of medication and stated he has a difficult time keeping everything straight. He stated he hasn't been weaning the Effexor correctly as he noticed when he was looking back at his chart. He wrote it down wrong on his planner, stating he is still taking Effexor 150mg daily for 4-5 more days, then will take every other day for 1 week, then will stop. Provider agreed to continue his plan to wean to lessen confusion. Patient agreed with plan to increase Lamotrigine, and continue Buspirone and Viibryd at current doses. Encouragement and reassurance given. Reviewed depression and anxiety screening results with patient as it shows symptoms are improving to reassure patient. When setting up an appointment, patient stated he didn't want an appointment in November as \"that is hunting month\" and goes hunting frequently. He agreed to set up an appointment in November, after offered December, however stated he may need to change it. Patient rates depression 5/10 and anxiety 8/10 today on a scale of 0-10 with 10 being the worst.  Sleep-  able to fall asleep, not able to stay asleep, and not feeling rested in morning.    Endorses nightmares  Interest- improving Previously he stated he works 7 days per week and doesn't have time to do anything else  Energy and motivation - improving  Concentration - poor  Memory -poor  Appetite- good  Denies irritability. Patient stated this has improved as he is more patient with people and \" I don't go off on people anymore\"  Denies restlessness  Endorses hopelessness, helplessness, and worthlessness  Denies suicidal or homicidal ideation, plan, or intent  Denies auditory, visual, or other hallucinations    New medical conditions or psychiatric admissions since seen by this provider last? Now he is taking \"Arthritis medication that is helping a lot\". He stated it is steroids and he will need to get off of them soon. Encouraged to find out the plan for his arthritis. Patient endorses currently taking the following psychiatric medications: Vilazodone (Viibryd) 20mg, lamictal 50mg, buspar 30mg BID  Previous psychiatric medications: Zoloft, Wellbutrin, Xanax, and others  Adverse reactions from psychotropic medications:  Denies  Patient Assets/Support system:  Patient stated \"he doesn't like to bother anyone\", his wife is not empathetic. He does spend 10 days with wife riding motorcycle per year. Endorsed coping skills: Stated he doesn't use coping skills except work and find something to do/distraction  The patient endorses feeling safe at home Yes  Current Substance Use: Denies    Abnormal Involuntary Movement Scale (AIMS): TOTAL SCORE = 0  0=none, 1=minimal, 2=mild, 3=moderate, 4=severe  Facial and Oral Movements:  1. Muscles of facial expression  0  2. Lips and perioral area 0  3. Jaw 0  4. Tongue 0  Extremity Movements:  5 Upper (arms, wrists, hands, fingers) 0  6. Lower (legs, knees, ankles, toes) 0  Trunk Movements:  7. Neck, shoulders, hips 0  Global Judgements:  8. Severity of abnormal movements overall 0  9. Incapacitation due to abnormal movements 0  10. Patient's awareness of abnormal movements 0  Dental Status:   11. Current problems with teeth and/or dentures? No  12. Are dentures usually worn? No    Motivational Interviewing and Cognitive Behavioral Therapy utilized, including behavioral modification, insight oriented, and supportive therapy. Patient is encouraged to utilize nonpharmacologic coping skills such as deep breathing, guided imagery, guided meditation, muscle relaxation, calming music, and/or journaling. Records review of communications , labs, and medications from an internal/external source completed. ALLERGIES:    Pcn [penicillins]     PAST MEDICAL HISTORY:    Past Medical History:   Diagnosis Date    Acid reflux     Depression     Hypertension        PREVIOUSMEDICATIONS:  Outpatient Medications Prior to Visit   Medication Sig Dispense Refill    nitroGLYCERIN (NITROSTAT) 0.4 MG SL tablet       tamsulosin (FLOMAX) 0.4 MG capsule       metoprolol succinate (TOPROL XL) 25 MG extended release tablet       ASPIRIN LOW DOSE 81 MG EC tablet       Vilazodone HCl 10 & 20 MG KIT Take 10 mg by mouth daily for 7 days, THEN 20 mg daily. 30 kit 0    lamoTRIgine (LAMICTAL) 25 MG tablet Take 2 tablets by mouth daily 60 tablet 1    busPIRone (BUSPAR) 30 MG tablet Take 30 mg by mouth in the morning and 30 mg in the evening. 60 tablet 1    meloxicam (MOBIC) 15 MG tablet Take 15 mg by mouth daily      lisinopril-hydrochlorothiazide (ZESTORETIC) 10-12.5 MG per tablet Take 1 tablet by mouth daily      omeprazole (PRILOSEC) 40 MG delayed release capsule Take 40 mg by mouth as needed       No facility-administered medications prior to visit. REVIEW OF SYSTEMS:    Review of Systems   Constitutional:  Positive for fatigue. Psychiatric/Behavioral:  Positive for agitation, decreased concentration, dysphoric mood and sleep disturbance. The patient is nervous/anxious. All other systems reviewed and are negative. The patient sees No primary care provider on file. as his primary care provider.     OBJECTIVE DATA     Wt Readings from Last 3 Encounters:   08/29/17 230 lb (104.3 kg)        Mental Status Exam:   Level of consciousness:  within normal limits  Appearance:  in chair and fair grooming   Behavior/Motor:  no abnormalities noted  Attitude toward examiner:  cooperative, attentive and good eye contact  Speech:  spontaneous, normal rate and normal volume  Mood:  \"good\" per patient ,   irritable, depressed  Affect:  mood incongruent, flat, blunted  Thought processes:  linear, goal directed and coherent  Thought content:  Denies homicidal ideation  Suicidal Ideation:  denies suicidal ideation  Delusions:  no evidence of delusions  Perceptual Disturbance:  denies any perceptual disturbance  Cognition: Patient is oriented to person, place, time and situation  Concentration: poor  Memory: poor  Insight & Judgement: limited   Medication Side Effects: Absent  Attention Span: Attention span and concentration were age appropriate    Screenings Completed in This Encounter:     Anxiety and Depression:      CLARISA-7 SCREENING 10/19/2022 9/28/2022 8/24/2022   Feeling nervous, anxious, or on edge Several days Several days Not at all   Not being able to stop or control worrying Nearly every day Nearly every day More than half the days   Worrying too much about different things Nearly every day Nearly every day More than half the days   Trouble relaxing Not at all Several days Not at all   Being so restless that it is hard to sit still Not at all Not at all Not at all   Becoming easily annoyed or irritable Not at all Not at all Not at all   Feeling afraid as if something awful might happen Nearly every day More than half the days Nearly every day   CLARISA-7 Total Score 10 10 7   How difficult have these problems made it for you to do your work, take care of things at home, or get along with other people? Very difficult Very difficult Somewhat difficult           PHQ-2 Over the past 2 weeks, how often have you been bothered by any of the following problems?   Little interest or pleasure in doing things: Not at all  Feeling down, depressed, or hopeless: Not at all  PHQ-2 Score: 0  PHQ-9 Over the past 2 weeks, how often have you been bothered by any of the following problems? Trouble falling or staying asleep, or sleeping too much: Nearly every day  Feeling tired or having little energy: Not at all  Poor appetite or overeating: Not at all  Feeling bad about yourself - or that you are a failure or have let yourself or your family down: Several days  Trouble concentrating on things, such as reading the newspaper or watching television: Nearly every day  Moving or speaking so slowly that other people could have noticed. Or the opposite - being so fidgety or restless that you have been moving around a lot more than usual: Several days  Thoughts that you would be better off dead, or of hurting yourself in some way: Not at all  If you checked off any problems, how difficult have these problems made it for you to do your work, take care of things at home, or get along with other people?: Somewhat difficult  PHQ-9 Total Score: 8  PHQ-9 Total Score: 8       DIAGNOSIS AND ASSESSMENT DATA     DSM-5 DIAGNOSIS:   1. Major depressive disorder, recurrent episode, moderate ([address]    2. CLARISA (generalized anxiety disorder)    3. Chronic post-traumatic stress disorder (PTSD)    4. Insomnia, persistent      Rule Out Bipolar II Disorder  Rule Out Borderline Personality Disorder  Rule Out Persistent Depressive Disorder (Dysthymia)  Rule Out OCD    Psychosocial and Contextual Factors[de-identified]  Financial  Occupational  Relationships  Legal  Living situation  Educational    PLAN   Follow-up:  Return in about 2 weeks (around 11/2/2022) for anxiety, depression, medication management.    Continue Viibryd (Vilazodone) 20mg daily for depression and anxiety  Increase Lamotrigine to 100mg daily for mood stability  Continue weaning Effexor XR 150mg daily for 4-5 days, then every other day for one week, then stop  (Effexor shows significant gene-drug interactions on Genesight results)  Continue Buspar to 30mg twice per day for anxiety  Genesight testing resulted 10/3/22: Viibryd, Lamotrigine, and Buspirone show no gene-drug interactions   Consider psychotherapy  Exercise 30 minutes 3-4 times per week  Increase fluids, drink at least 8 glasses of water daily, no caffeine after 3pm  Sleep hygiene  Healthy diet    Prescriptions for this encounter:  New Prescriptions    VILAZODONE HCL (VIIBRYD) 20 MG TABS    Take 1 tablet by mouth daily       Orders Placed This Encounter   Medications    vilazodone HCl (VIIBRYD) 20 MG TABS     Sig: Take 1 tablet by mouth daily     Dispense:  30 tablet     Refill:  1    lamoTRIgine (LAMICTAL) 100 MG tablet     Sig: Take 1 tablet by mouth daily     Dispense:  30 tablet     Refill:  1    busPIRone (BUSPAR) 30 MG tablet     Sig: Take 30 mg by mouth in the morning and 30 mg in the evening. Dispense:  60 tablet     Refill:  1           Medications Discontinued During This Encounter   Medication Reason    Vilazodone HCl 10 & 20 MG KIT Therapy completed    lamoTRIgine (LAMICTAL) 25 MG tablet     busPIRone (BUSPAR) 30 MG tablet REORDER           Additional orders:  No orders of the defined types were placed in this encounter. Antidepressant Medications: We discussed the risks/benefits and side effects of medications. I stressed in particular side effects including but not limited to gastrointestinal problems, sexual dysfunction, serotonin syndrome, agitation, rare induction of arlene, rare activation of suicidality. Antipsychotic Medication: We discussed the risks/benefits and side effects of medications. I stressed in particular side effects including but not limited to metabolic syndrome, weight gain, increased prolactin levels, tardive dyskinesia, QTc prolongation, neuroleptic malignant syndrome, excessive somnolence, or confusion.    Discussed Serotonin Syndrome symptoms to watch for, stop medications immediately and go to hospital for treatment immediately:   Agitation or restlessness. Insomnia. Confusion. Rapid heart rate and high blood pressure. Dilated pupils. Loss of muscle coordination or twitching muscles. High blood pressure. and  Muscle rigidity. We discussed the effects alcohol and illicit substances have on mood, thought process, physical health and interactions with medications. Discussed the side effects of nicotine and caffeine in sleep disturbance and anxiety. The client and I reviewed several treatment options to address his/her symptoms. I explained the risks/benefits of treatment with and without medication. The patient participated in and indicated understanding of the content of our discussion by agreeing to the mutually developed treatment plan. Risks, potential side effects, possible drug-drug interactions, benefits and alternate treatments discussed in detail. All questions answered. Patient stated understanding and is agreeable to treatment plan. Patient has been instructed to seek emergency help via the emergency and/or calling 911 should symptoms become severe, worsen, or with other concerning symptoms. Patient instructed to go immediately to the emergency room and/or call 911 with any suicidal or homicidal ideations or if audio/visual hallucinations develop. Patient stated understanding and agrees. Patient given crisis center information. I spent a total of 60 minutes with the patient and over half of that time was spent on counseling and coordination of care regarding topics discussed above. I spent 55 minutes with the patient for psychotherapy. The patient was engaged and responsive throughout session. Utilized CBT, MI and reflective listening to address topics above. The remainder of session spent on symptom evaluation and medication management.     Provider Signature:  Electronically signed by JAIME Virk CNP on 10/19/2022 at 12:55 PM    **This report has been created using voice recognition software. It may contain minor errors which are inherent in voice recognition technology. **     An electronic signature was used to authenticate this note.

## 2022-11-01 ENCOUNTER — TELEPHONE (OUTPATIENT)
Dept: PSYCHOLOGY | Age: 57
End: 2022-11-01

## 2022-11-01 NOTE — TELEPHONE ENCOUNTER
Art was called to confirm appt scheduled for 11/2. Art asked to r/s to December. He is in [place] but is also frustrated about med changes. He began to express frustration. He states he has dizziness and that this is nothing new when there are med changes. He thinks the dizziness would go away if he could stay on a medication long enough. He kept repeating that he is tired of making med changes. He didn't know what his medications were or what med might be the issue. Pt was frustrated and was short with answers. Didn't express any other sx. Please advise. He also states he stopped smoking yesterday.

## 2022-11-01 NOTE — TELEPHONE ENCOUNTER
At previous visits, patients symptoms were not improving. Also, Genesight results showed significant interaction with Effexor, and therefore was switched. Patient rescheduled tomorrow's appointment into December which is okay to allow the medications to work at the doses they are at. Side effects are common for the first few weeks, however should subside.    Thank you,  Karina Jameson

## 2022-12-08 DIAGNOSIS — F43.12 CHRONIC POST-TRAUMATIC STRESS DISORDER (PTSD): ICD-10-CM

## 2022-12-08 DIAGNOSIS — F33.1 MAJOR DEPRESSIVE DISORDER, RECURRENT EPISODE, MODERATE (HCC): ICD-10-CM

## 2022-12-08 DIAGNOSIS — F41.1 GAD (GENERALIZED ANXIETY DISORDER): ICD-10-CM

## 2022-12-08 RX ORDER — LAMOTRIGINE 100 MG/1
100 TABLET ORAL DAILY
Qty: 30 TABLET | Refills: 0 | Status: SHIPPED | OUTPATIENT
Start: 2022-12-08 | End: 2023-01-07

## 2022-12-08 NOTE — TELEPHONE ENCOUNTER
Patient called into the office reporting he spoke to the pharmacist who said they called our office regarding prescription for Lamotrigine, requesting a new RX because patient is out of medication sooner than he should be (reports may have lost medication). Patient states the pharmacist told him that we told the pharmacist we would not fill the prescription until he is seen on 12/16/2022. Apologized to patient, advised that I do not see any documentation where the pharmacy called to request a new prescription because of the lost medication. Advised that I would send a message to provider requesting a new Rx to be sent. He is aware that insurance may not cover the cost if it is too soon. He is aware that he may use the Good Rx coupon.

## 2022-12-13 NOTE — PROGRESS NOTES
[address] [address]  Dept: [phone]  Dept Fax: [phone]  Loc: [phone]    Visit Date: 12/14/2022    SUBJECTIVE DATA       CHIEF COMPLAINT:    Chief Complaint   Patient presents with    Follow-up    Medication Check    Medication Refill    Mental Health Problem     History obtained from: patient    HISTORY OF PRESENT ILLNESS:    Brina Perez is a 59 y.o. male who presents to the office for follow up with medication management. HPI  Patient presents irritable, calm and cooperative. Patient stated he is \"depressed\" today. Patient stated the medication isn't working, feels \"angry\". Patient in a constant negative state, complaining about money, won't see a therapist because it takes time out from making money. Patient stated he has had a change in bowel patterns, but won't see GI as he \"doesn't have time for that\". Patient stated he went [place] [employer] for a week and didn't feel pleasure in it as he wanted to be working instead, felt he needed to be working as he is \"behind [employer] Patient stated he needs surgery and would have to take 3 months off work, but he \"can't afford it\". Explained there are payment plans. He stated \"I don't have a problem paying for the surgery, its the time I would be off not making money\". Patient insistent strategies won't work, doesn't feel increasing medication will improve his mood. Reassured patient and encouraged to try as it could help him feel better. Patient manipulative, stated he was \"off Lamotrigine for 1 week\" as prescription not refilled. Patient splitting, trying to argue whether provider or pharmacy \"at fault\" for not filling medication for him.   Explained he cancelled his last appointment, hasn't been seen for 2 months, and can't restart Lamotrigine at the dose he was on previously as there is a black box warning of SJS, it was unclear how long he had been off the medication and need to reassess prior to refill. Patient then stated \"I wasn't off the medication\", \"I never said I was off the medication\". Provider explained I'm trying to help him, but if he would prefer seeing another provider, it may be best. Patient didn't respond. Then he stated \"you have my phone number, you could have called me\". Explained he needs to take responsibility for his medication and getting it refilled appropriately and timely. Explained he needs to call the office if he has questions or concerns. Discussed medication management with patient. Patient agreed with plan to increase Lamotrigine, increase Viibryd, and continue Buspirone. Worked with patient to set up another appointment, however if patient continues manipulative behavior, he will be discharged from my care. Patient rates depression 5/10 and anxiety 8/10 today on a scale of 0-10 with 10 being the worst.  Sleep-  able to fall asleep, able to stay asleep, and not feeling rested in morning. Endorses anxiety attacks again  Denies nightmares  Interest- fair  Previously he stated he works 7 days per week and doesn't have time to do anything else  Energy and motivation - diminished  Concentration - poor  Memory -poor  Appetite- good  Endorses irritability, stated \"hits things\", \"snaps\" at people. \"I'm angry\", \"complains about the bad day he had\" to his family  Denies restlessness  Endorses hopelessness, helplessness, and worthlessness  Denies suicidal or homicidal ideation, plan, or intent  Denies auditory, visual, or other hallucinations    New medical conditions or psychiatric admissions since seen by this provider last? Now he is taking \"Arthritis medication that is helping a lot\". He stated it is steroids and he will need to get off of them soon. Encouraged to find out the plan for his arthritis.    Patient endorses currently taking the following psychiatric medications: Vilazodone (Viibryd) 20mg, lamictal 100mg, buspar 30mg BID  Previous psychiatric medications: Zoloft, Wellbutrin, Xanax, and others  Adverse reactions from psychotropic medications:  Denies  Patient Assets/Support system:  Patient stated \"he doesn't like to bother anyone\", his wife is not empathetic. He does spend 10 days with wife riding motorcycle per year. Endorsed coping skills: Stated he doesn't use coping skills except work and find something to do/distraction  The patient endorses feeling safe at home Yes  Current Substance Use: Denies    Abnormal Involuntary Movement Scale (AIMS): TOTAL SCORE = 0  0=none, 1=minimal, 2=mild, 3=moderate, 4=severe  Facial and Oral Movements:  1. Muscles of facial expression  0  2. Lips and perioral area 0  3. Jaw 0  4. Tongue 0  Extremity Movements:  5 Upper (arms, wrists, hands, fingers) 0  6. Lower (legs, knees, ankles, toes) 0  Trunk Movements:  7. Neck, shoulders, hips 0  Global Judgements:  8. Severity of abnormal movements overall 0  9. Incapacitation due to abnormal movements 0  10. Patient's awareness of abnormal movements 0  Dental Status:   11. Current problems with teeth and/or dentures? No  12. Are dentures usually worn? No    Motivational Interviewing and Cognitive Behavioral Therapy utilized, including behavioral modification, insight oriented, and supportive therapy. Patient is encouraged to utilize nonpharmacologic coping skills such as deep breathing, guided imagery, guided meditation, muscle relaxation, calming music, and/or journaling. Records review of communications , labs, and medications from an internal/external source completed.     ALLERGIES:    Pcn [penicillins]     PAST MEDICAL HISTORY:    Past Medical History:   Diagnosis Date    Acid reflux     Depression     Hypertension        PREVIOUSMEDICATIONS:  Outpatient Medications Prior to Visit   Medication Sig Dispense Refill    lamoTRIgine (LAMICTAL) 100 MG tablet Take 1 tablet by mouth daily 30 tablet 0    nitroGLYCERIN (NITROSTAT) 0.4 MG SL tablet       tamsulosin (FLOMAX) 0.4 MG capsule       metoprolol succinate (TOPROL XL) 25 MG extended release tablet       ASPIRIN LOW DOSE 81 MG EC tablet       vilazodone HCl (VIIBRYD) 20 MG TABS Take 1 tablet by mouth daily 30 tablet 1    busPIRone (BUSPAR) 30 MG tablet Take 30 mg by mouth in the morning and 30 mg in the evening. 60 tablet 1    meloxicam (MOBIC) 15 MG tablet Take 15 mg by mouth daily      lisinopril-hydrochlorothiazide (ZESTORETIC) 10-12.5 MG per tablet Take 1 tablet by mouth daily      omeprazole (PRILOSEC) 40 MG delayed release capsule Take 40 mg by mouth as needed       No facility-administered medications prior to visit. REVIEW OF SYSTEMS:    Review of Systems   Constitutional:  Positive for fatigue. Psychiatric/Behavioral:  Positive for agitation, decreased concentration, dysphoric mood and sleep disturbance. The patient is nervous/anxious. All other systems reviewed and are negative. The patient sees No primary care provider on file. as his primary care provider.     OBJECTIVE DATA     Wt Readings from Last 3 Encounters:   08/29/17 230 lb (104.3 kg)        Mental Status Exam:   Level of consciousness:  within normal limits  Appearance:  in chair and fair grooming   Behavior/Motor:  no abnormalities noted  Attitude toward examiner:  cooperative, attentive and good eye contact  Speech:  spontaneous, normal rate and normal volume  Mood:  \"depressed\" per patient ,   irritable, depressed  Affect:  mood incongruent, flat, blunted  Thought processes:  linear, goal directed and coherent  Thought content:  Denies homicidal ideation  Suicidal Ideation:  denies suicidal ideation  Delusions:  no evidence of delusions  Perceptual Disturbance:  denies any perceptual disturbance  Cognition: Patient is oriented to person, place, time and situation  Concentration: poor  Memory: poor  Insight & Judgement: limited   Medication Side Effects: Absent  Attention Span: Attention span and concentration were age appropriate    Screenings Completed in This Encounter:     Anxiety and Depression:      CLARISA-7 SCREENING 12/14/2022 10/19/2022 9/28/2022   Feeling nervous, anxious, or on edge Several days Several days Several days   Not being able to stop or control worrying Nearly every day Nearly every day Nearly every day   Worrying too much about different things Nearly every day Nearly every day Nearly every day   Trouble relaxing Not at all Not at all Several days   Being so restless that it is hard to sit still Not at all Not at all Not at all   Becoming easily annoyed or irritable Nearly every day Not at all Not at all   Feeling afraid as if something awful might happen Several days Nearly every day More than half the days   CLARISA-7 Total Score 11 10 10   How difficult have these problems made it for you to do your work, take care of things at home, or get along with other people? Very difficult Very difficult Very difficult           PHQ-2 Over the past 2 weeks, how often have you been bothered by any of the following problems? Little interest or pleasure in doing things: Not at all  Feeling down, depressed, or hopeless: Nearly every day  PHQ-2 Score: 3  PHQ-9 Over the past 2 weeks, how often have you been bothered by any of the following problems? Trouble falling or staying asleep, or sleeping too much: Not at all  Feeling tired or having little energy: Nearly every day  Poor appetite or overeating: Not at all  Feeling bad about yourself - or that you are a failure or have let yourself or your family down: Nearly every day  Trouble concentrating on things, such as reading the newspaper or watching television: Nearly every day  Moving or speaking so slowly that other people could have noticed.  Or the opposite - being so fidgety or restless that you have been moving around a lot more than usual: Several days  Thoughts that you would be better off dead, or of hurting yourself in some way: Not at all  If you checked off any problems, how difficult have these problems made it for you to do your work, take care of things at home, or get along with other people?: Very difficult  PHQ-9 Total Score: 13  PHQ-9 Total Score: 13       DIAGNOSIS AND ASSESSMENT DATA     DSM-5 DIAGNOSIS:   1. Major depressive disorder, recurrent episode, moderate ([facility] [address]    2. CLARISA (generalized anxiety disorder)    3. Chronic post-traumatic stress disorder (PTSD)    4. Insomnia, persistent      Rule Out Bipolar II Disorder  Rule Out Borderline Personality Disorder  Rule Out Persistent Depressive Disorder (Dysthymia)  Rule Out OCD    Psychosocial and Contextual Factors[de-identified]  Financial  Occupational  Relationships  Legal  Living situation  Educational    PLAN   Follow-up:  Return in about 2 months (around 2/14/2023) for anxiety, depression, irritability, medication management, mood. Increase Viibryd (Vilazodone) to 40mg daily for depression and anxiety  Increase Lamotrigine to 150mg daily for mood stability  Continue Buspar to 30mg twice per day for anxiety  Genesight testing resulted 10/3/22: Viibryd, Lamotrigine, and Buspirone show no gene-drug interactions   Consider psychotherapy  Exercise 30 minutes 3-4 times per week  Increase fluids, drink at least 8 glasses of water daily, no caffeine after 3pm  Sleep hygiene  Healthy diet    Prescriptions for this encounter:  New Prescriptions    No medications on file       Orders Placed This Encounter   Medications    lamoTRIgine (LAMICTAL) 150 MG tablet     Sig: Take 1 tablet by mouth daily     Dispense:  30 tablet     Refill:  2    vilazodone HCl (VIIBRYD) 40 MG TABS     Sig: Take 1 tablet by mouth daily     Dispense:  30 tablet     Refill:  2    busPIRone (BUSPAR) 30 MG tablet     Sig: Take 30 mg by mouth in the morning and 30 mg in the evening.      Dispense:  60 tablet     Refill:  2       Medications Discontinued During This Encounter   Medication Reason    vilazodone HCl (VIIBRYD) 20 MG TABS     busPIRone (BUSPAR) 30 MG tablet REORDER    lamoTRIgine (LAMICTAL) 100 MG tablet        Additional orders:  No orders of the defined types were placed in this encounter. Antidepressant Medications: We discussed the risks/benefits and side effects of medications. I stressed in particular side effects including but not limited to gastrointestinal problems, sexual dysfunction, serotonin syndrome, agitation, rare induction of arlene, rare activation of suicidality. Antipsychotic Medication: We discussed the risks/benefits and side effects of medications. I stressed in particular side effects including but not limited to metabolic syndrome, weight gain, increased prolactin levels, tardive dyskinesia, QTc prolongation, neuroleptic malignant syndrome, excessive somnolence, or confusion. Discussed Serotonin Syndrome symptoms to watch for, stop medications immediately and go to hospital for treatment immediately:   Agitation or restlessness. Insomnia. Confusion. Rapid heart rate and high blood pressure. Dilated pupils. Loss of muscle coordination or twitching muscles. High blood pressure. and  Muscle rigidity. We discussed the effects alcohol and illicit substances have on mood, thought process, physical health and interactions with medications. Discussed the side effects of nicotine and caffeine in sleep disturbance and anxiety. The client and I reviewed several treatment options to address his/her symptoms. I explained the risks/benefits of treatment with and without medication. The patient participated in and indicated understanding of the content of our discussion by agreeing to the mutually developed treatment plan. Risks, potential side effects, possible drug-drug interactions, benefits and alternate treatments discussed in detail. All questions answered. Patient stated understanding and is agreeable to treatment plan.      Patient has been instructed to seek emergency help via the emergency and/or calling 911 should symptoms become severe, worsen, or with other concerning symptoms. Patient instructed to go immediately to the emergency room and/or call 911 with any suicidal or homicidal ideations or if audio/visual hallucinations develop. Patient stated understanding and agrees. Patient given crisis center information. I spent a total of 40 minutes with the patient and over half of that time was spent on counseling and coordination of care regarding topics discussed above. I spent 38 minutes with the patient for psychotherapy. The patient was engaged and responsive throughout session. Utilized CBT, MI and reflective listening to address topics above. The remainder of session spent on symptom evaluation and medication management. Provider Signature:  Electronically signed by JAIME Valentin CNP on 12/14/2022 at 11:00 AM    **This report has been created using voice recognition software. It may contain minor errors which are inherent in voice recognition technology. **     An electronic signature was used to authenticate this note.

## 2022-12-14 ENCOUNTER — OFFICE VISIT (OUTPATIENT)
Dept: PSYCHIATRY | Age: 57
End: 2022-12-14

## 2022-12-14 DIAGNOSIS — G47.00 INSOMNIA, PERSISTENT: ICD-10-CM

## 2022-12-14 DIAGNOSIS — F41.1 GAD (GENERALIZED ANXIETY DISORDER): ICD-10-CM

## 2022-12-14 DIAGNOSIS — F43.12 CHRONIC POST-TRAUMATIC STRESS DISORDER (PTSD): ICD-10-CM

## 2022-12-14 DIAGNOSIS — F33.1 MAJOR DEPRESSIVE DISORDER, RECURRENT EPISODE, MODERATE (HCC): Primary | ICD-10-CM

## 2022-12-14 RX ORDER — VILAZODONE HYDROCHLORIDE 40 MG/1
40 TABLET ORAL DAILY
Qty: 30 TABLET | Refills: 2 | Status: SHIPPED | OUTPATIENT
Start: 2022-12-14

## 2022-12-14 RX ORDER — LAMOTRIGINE 150 MG/1
150 TABLET ORAL DAILY
Qty: 30 TABLET | Refills: 2 | Status: SHIPPED | OUTPATIENT
Start: 2022-12-14 | End: 2023-03-14

## 2022-12-14 RX ORDER — BUSPIRONE HYDROCHLORIDE 30 MG/1
30 TABLET ORAL 2 TIMES DAILY
Qty: 60 TABLET | Refills: 2 | Status: SHIPPED | OUTPATIENT
Start: 2022-12-14

## 2022-12-14 ASSESSMENT — PATIENT HEALTH QUESTIONNAIRE - PHQ9
10. IF YOU CHECKED OFF ANY PROBLEMS, HOW DIFFICULT HAVE THESE PROBLEMS MADE IT FOR YOU TO DO YOUR WORK, TAKE CARE OF THINGS AT HOME, OR GET ALONG WITH OTHER PEOPLE: 2
5. POOR APPETITE OR OVEREATING: 0
3. TROUBLE FALLING OR STAYING ASLEEP: 0
2. FEELING DOWN, DEPRESSED OR HOPELESS: 3
SUM OF ALL RESPONSES TO PHQ QUESTIONS 1-9: 13
1. LITTLE INTEREST OR PLEASURE IN DOING THINGS: 0
SUM OF ALL RESPONSES TO PHQ QUESTIONS 1-9: 13
7. TROUBLE CONCENTRATING ON THINGS, SUCH AS READING THE NEWSPAPER OR WATCHING TELEVISION: 3
6. FEELING BAD ABOUT YOURSELF - OR THAT YOU ARE A FAILURE OR HAVE LET YOURSELF OR YOUR FAMILY DOWN: 3
4. FEELING TIRED OR HAVING LITTLE ENERGY: 3
SUM OF ALL RESPONSES TO PHQ QUESTIONS 1-9: 13
8. MOVING OR SPEAKING SO SLOWLY THAT OTHER PEOPLE COULD HAVE NOTICED. OR THE OPPOSITE, BEING SO FIGETY OR RESTLESS THAT YOU HAVE BEEN MOVING AROUND A LOT MORE THAN USUAL: 1
SUM OF ALL RESPONSES TO PHQ9 QUESTIONS 1 & 2: 3
9. THOUGHTS THAT YOU WOULD BE BETTER OFF DEAD, OR OF HURTING YOURSELF: 0
SUM OF ALL RESPONSES TO PHQ QUESTIONS 1-9: 13

## 2022-12-14 ASSESSMENT — ANXIETY QUESTIONNAIRES
6. BECOMING EASILY ANNOYED OR IRRITABLE: 3
2. NOT BEING ABLE TO STOP OR CONTROL WORRYING: 3
IF YOU CHECKED OFF ANY PROBLEMS ON THIS QUESTIONNAIRE, HOW DIFFICULT HAVE THESE PROBLEMS MADE IT FOR YOU TO DO YOUR WORK, TAKE CARE OF THINGS AT HOME, OR GET ALONG WITH OTHER PEOPLE: VERY DIFFICULT
4. TROUBLE RELAXING: 0
GAD7 TOTAL SCORE: 11
1. FEELING NERVOUS, ANXIOUS, OR ON EDGE: 1
5. BEING SO RESTLESS THAT IT IS HARD TO SIT STILL: 0
7. FEELING AFRAID AS IF SOMETHING AWFUL MIGHT HAPPEN: 1
3. WORRYING TOO MUCH ABOUT DIFFERENT THINGS: 3

## 2023-01-04 ENCOUNTER — TELEPHONE (OUTPATIENT)
Dept: PSYCHIATRY | Age: 58
End: 2023-01-04

## 2023-01-04 NOTE — LETTER
[address] [address]  Dept: [phone]  Dept Fax: [phone]  Loc: [phone]      1/4/2023    Dear Palomo Potts,    I find it necessary to inform you that I must withdraw my professional commitment to you as a psychiatric nurse practitioner due to a breakdown in the doctor/patient relationship. It is essential that you continue to receive medical care for your condition. Therefore, I recommend you make immediate arrangements with another physician to provide the needed care. For emergency medical services, please go to the nearest emergency room for treatment. If you wish, I will continue to treat your urgent medical needs which may develop for the following thirty (30) days from today's date. Enclosed is a form authorizing me to release a copy of your medical records to your new treating physician. I will forward your records promptly upon receipt of this form, signed by you, with completed name and address of the physician to receive your records. If you have any questions regarding the contents of this letter, you may reach me at my office during normal business hours.     Respectfully,        Rosalva Rajan, JAIME - CNP

## 2023-01-04 NOTE — TELEPHONE ENCOUNTER
Laurel Sosa called into the office to report that when he started Vilazodone he felt \"very beat down\". Reports he also started smoking at that time and noticed an increase in irritability. Reports he has never experienced irritability like this with smoking and feels it's related to the vilazodone. Reports he was seen at ER earlier last week. Reports he was disoriented, irritable, dizzy, unable to think, \"head was fuzzy\". He was referred back to PCP following ER discharge. PCP advised him to follow up with psych and referred patient to neurology. Laurel Sosa states he received permission from his PCP to d/c Vilazodone. His last dose was yesterday morning at 9 am. PCP did not provide taper schedule or recommendations. Reports feeling dizzy, weak, and disoriented. Denies chest pain, SOB, blurred vision. Laurel Sosa states he noticed the agitation when he was on the 20 mg but this significantly worsened with increase to 40 mg.

## 2023-01-04 NOTE — Clinical Note
[address] [facility]  [address] [address]  [place] [address]  Phone: [phone]  Fax: [phone] JAIME Whitney CNP        January 4, 2023    Joceline Garcia  [facility] [facility] [address]      Dear Emery Martinez:    ***    If you have any questions or concerns, please don't hesitate to call.     Sincerely,        JAIME Quijano CNP

## 2023-01-04 NOTE — TELEPHONE ENCOUNTER
Wean down Vilazodone by taking 20mg for 3 days, then 20mg every other day for 3 doses, then stop. Gunner Peraza, I feel you need to find a new psychiatric provider due to your history of noncompliance with medications, stopping medications without my input, not willing to accept suggestions for treatment, and argumentative nature with provider. I would recommend seeing a therapist.    [place], Please send a discharge letter to Martita Stephen and cancel his follow up appointment.      Thank you,  Shavon